# Patient Record
Sex: FEMALE | Race: WHITE | Employment: OTHER | URBAN - METROPOLITAN AREA
[De-identification: names, ages, dates, MRNs, and addresses within clinical notes are randomized per-mention and may not be internally consistent; named-entity substitution may affect disease eponyms.]

---

## 2017-04-14 ENCOUNTER — HOSPITAL ENCOUNTER (EMERGENCY)
Facility: HOSPITAL | Age: 82
Discharge: HOME | End: 2017-04-14
Attending: EMERGENCY MEDICINE
Payer: MEDICARE

## 2017-04-14 ENCOUNTER — APPOINTMENT (OUTPATIENT)
Dept: CT IMAGING | Facility: HOSPITAL | Age: 82
End: 2017-04-14
Attending: EMERGENCY MEDICINE
Payer: MEDICARE

## 2017-04-14 VITALS
SYSTOLIC BLOOD PRESSURE: 106 MMHG | HEART RATE: 61 BPM | BODY MASS INDEX: 29.83 KG/M2 | RESPIRATION RATE: 16 BRPM | WEIGHT: 158 LBS | DIASTOLIC BLOOD PRESSURE: 89 MMHG | OXYGEN SATURATION: 98 % | HEIGHT: 61 IN

## 2017-04-14 DIAGNOSIS — S00.83XA FACIAL CONTUSION, INITIAL ENCOUNTER: ICD-10-CM

## 2017-04-14 DIAGNOSIS — S06.0X0A CONCUSSION, WITHOUT LOC, INITIAL ENCOUNTER: Primary | ICD-10-CM
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
## 2017-04-14 PROCEDURE — 99284 EMERGENCY DEPT VISIT MOD MDM: CPT

## 2017-04-14 PROCEDURE — 96372 THER/PROPH/DIAG INJ SC/IM: CPT

## 2017-04-14 PROCEDURE — 70450 CT HEAD/BRAIN W/O DYE: CPT

## 2017-04-14 RX ORDER — MORPHINE SULFATE 4 MG/ML
4 INJECTION, SOLUTION INTRAMUSCULAR; INTRAVENOUS ONCE
Status: COMPLETED | OUTPATIENT
Start: 2017-04-14 | End: 2017-04-14

## 2017-04-14 NOTE — ED NOTES
All discharge instructions including  follow up reviewed with patient. Verbalized understanding. Patient wheelchaired out of ED in no apparent distress.

## 2017-04-15 NOTE — ED PROVIDER NOTES
Patient Seen in: [facility] [place] AND [facility] Emergency Department    History   Patient presents with:  Head Neck Injury (neurologic, musculoskeletal)      HPI    The patient presents after tripping over her door threshold at her house today roughly 1 hour prior to abdominal pain. Genitourinary: Negative for dysuria and hematuria. Musculoskeletal: Negative for back pain and neck pain. Skin: Negative for rash and wound. Neurological: Positive for headaches. Negative for syncope.        Constitutional and vital contusion/hematoma with contiguous left frontal scalp hematoma.          MDM     Pulse Ox: 98%, Room air, Normal     Monitor Interpretation:   normal sinus rhythm    Differential Diagnosis: Fall, facial contusion, ICH    ED Course: Patient presents after me

## 2017-04-24 PROCEDURE — 85025 COMPLETE CBC W/AUTO DIFF WBC: CPT | Performed by: INTERNAL MEDICINE

## 2017-04-24 PROCEDURE — 80061 LIPID PANEL: CPT | Performed by: INTERNAL MEDICINE

## 2017-04-24 PROCEDURE — 84443 ASSAY THYROID STIM HORMONE: CPT | Performed by: INTERNAL MEDICINE

## 2017-04-24 PROCEDURE — 36415 COLL VENOUS BLD VENIPUNCTURE: CPT | Performed by: INTERNAL MEDICINE

## 2017-04-24 PROCEDURE — 83036 HEMOGLOBIN GLYCOSYLATED A1C: CPT | Performed by: INTERNAL MEDICINE

## 2017-04-24 PROCEDURE — 80053 COMPREHEN METABOLIC PANEL: CPT | Performed by: INTERNAL MEDICINE

## 2017-05-30 PROBLEM — R11.0 NAUSEA: Status: ACTIVE | Noted: 2017-05-30

## 2017-05-30 PROBLEM — R07.9 CHEST PAIN: Status: ACTIVE | Noted: 2017-05-30

## 2017-06-13 ENCOUNTER — HOSPITAL ENCOUNTER (OUTPATIENT)
Facility: HOSPITAL | Age: 82
Setting detail: HOSPITAL OUTPATIENT SURGERY
Discharge: HOME | End: 2017-06-13
Attending: INTERNAL MEDICINE | Admitting: INTERNAL MEDICINE
Payer: MEDICARE

## 2017-06-13 ENCOUNTER — SURGERY (OUTPATIENT)
Age: 82
End: 2017-06-13

## 2017-06-13 PROCEDURE — 88305 TISSUE EXAM BY PATHOLOGIST: CPT | Performed by: INTERNAL MEDICINE

## 2017-06-13 PROCEDURE — 88312 SPECIAL STAINS GROUP 1: CPT | Performed by: INTERNAL MEDICINE

## 2017-06-13 PROCEDURE — 0DB68ZX EXCISION OF STOMACH, VIA NATURAL OR ARTIFICIAL OPENING ENDOSCOPIC, DIAGNOSTIC: ICD-10-PCS | Performed by: INTERNAL MEDICINE

## 2017-06-13 PROCEDURE — 36415 COLL VENOUS BLD VENIPUNCTURE: CPT | Performed by: INTERNAL MEDICINE

## 2017-06-13 RX ORDER — MIDAZOLAM HYDROCHLORIDE 1 MG/ML
1 INJECTION INTRAMUSCULAR; INTRAVENOUS EVERY 5 MIN PRN
Status: DISCONTINUED | OUTPATIENT
Start: 2017-06-13 | End: 2017-06-13

## 2017-06-13 RX ORDER — COVID-19 ANTIGEN TEST
220 KIT MISCELLANEOUS
COMMUNITY
End: 2017-06-14

## 2017-06-13 RX ORDER — SODIUM CHLORIDE 0.9 % (FLUSH) 0.9 %
10 SYRINGE (ML) INJECTION AS NEEDED
Status: DISCONTINUED | OUTPATIENT
Start: 2017-06-13 | End: 2017-06-13

## 2017-06-13 RX ORDER — MIDAZOLAM HYDROCHLORIDE 1 MG/ML
INJECTION INTRAMUSCULAR; INTRAVENOUS
Status: DISCONTINUED | OUTPATIENT
Start: 2017-06-13 | End: 2017-06-13

## 2017-06-13 RX ORDER — SODIUM CHLORIDE, SODIUM LACTATE, POTASSIUM CHLORIDE, CALCIUM CHLORIDE 600; 310; 30; 20 MG/100ML; MG/100ML; MG/100ML; MG/100ML
INJECTION, SOLUTION INTRAVENOUS CONTINUOUS
Status: DISCONTINUED | OUTPATIENT
Start: 2017-06-13 | End: 2017-06-13

## 2017-06-13 NOTE — H&P
[address] [place] [address] Patient Status:  [facility]    1935 MRN [id number]   Location [facility] ENDOSCOPY LAB SUITES Attending Angelita Skinner MD   Hosp Day # 0 PCP Artemio Ellis Take 1 tablet (25 mcg total) by mouth before breakfast.   atorvastatin 40 MG Oral Tab Take 1 tablet (40 mg total) by mouth nightly.  at bedtime   HYDROcodone-acetaminophen 5-325 MG Oral Tab TAKE 1 TABLET BY MOUTH TWO TIMES A DAY AS NEEDED   AMLODIPINE BESYL

## 2017-06-15 VITALS
HEART RATE: 51 BPM | SYSTOLIC BLOOD PRESSURE: 94 MMHG | HEIGHT: 61 IN | DIASTOLIC BLOOD PRESSURE: 40 MMHG | BODY MASS INDEX: 30.58 KG/M2 | RESPIRATION RATE: 20 BRPM | WEIGHT: 162 LBS | OXYGEN SATURATION: 98 %

## 2017-07-20 PROBLEM — K25.9 GASTRIC ULCER WITHOUT HEMORRHAGE OR PERFORATION, UNSPECIFIED CHRONICITY: Status: ACTIVE | Noted: 2017-07-20

## 2017-09-14 ENCOUNTER — HOSPITAL (OUTPATIENT)
Dept: OTHER | Age: 82
End: 2017-09-14
Attending: INTERNAL MEDICINE

## 2017-10-18 PROBLEM — N18.30 CKD (CHRONIC KIDNEY DISEASE) STAGE 3, GFR 30-59 ML/MIN (HCC): Status: ACTIVE | Noted: 2017-10-18

## 2017-11-07 PROCEDURE — 86803 HEPATITIS C AB TEST: CPT | Performed by: INTERNAL MEDICINE

## 2018-05-05 NOTE — PROGRESS NOTES
Quick Note:    Left voice mail message to contact office at earliest convenience to discuss test result/MD recommendations. Provided office number [phone], option 2 and ask for triage RN.  Unable to reach at home and cell #.    ______
Quick Note:    Spoke with pt regarding test results and recommendations as noted per Dr. Monica Castellanos. Pt verbalized understanding. OV 7/24/17 @ 1:40 pm [facility] office location.     ______
Pt stubbed Rit 4th toe

## 2018-12-12 PROBLEM — F32.A DEPRESSION, UNSPECIFIED DEPRESSION TYPE: Status: ACTIVE | Noted: 2018-12-12

## 2018-12-12 PROCEDURE — 83013 H PYLORI (C-13) BREATH: CPT | Performed by: INTERNAL MEDICINE

## 2019-04-11 ENCOUNTER — HOSPITAL (OUTPATIENT)
Dept: OTHER | Age: 84
End: 2019-04-11
Attending: INTERNAL MEDICINE

## 2019-05-14 PROBLEM — E03.9 HYPOTHYROIDISM, UNSPECIFIED TYPE: Status: ACTIVE | Noted: 2019-05-14

## 2022-03-24 PROBLEM — I48.91 ATRIAL FIBRILLATION, UNSPECIFIED TYPE (HCC): Status: ACTIVE | Noted: 2022-03-24

## 2022-05-11 ENCOUNTER — HOSPITAL ENCOUNTER (OUTPATIENT)
Dept: MAMMOGRAPHY | Age: 87
End: 2022-05-11
Attending: INTERNAL MEDICINE

## 2022-05-19 ENCOUNTER — HOSPITAL ENCOUNTER (OUTPATIENT)
Dept: ULTRASOUND IMAGING | Age: 87
Discharge: HOME | End: 2022-05-19
Attending: INTERNAL MEDICINE

## 2022-05-19 ENCOUNTER — HOSPITAL ENCOUNTER (OUTPATIENT)
Dept: MAMMOGRAPHY | Age: 87
Discharge: HOME | End: 2022-05-19
Attending: INTERNAL MEDICINE

## 2022-05-19 DIAGNOSIS — N64.4 BREAST PAIN, LEFT: ICD-10-CM

## 2022-05-19 DIAGNOSIS — Z98.82 H/O BILATERAL BREAST IMPLANTS: ICD-10-CM

## 2022-05-19 DIAGNOSIS — R92.8 ABNORMAL MAMMOGRAM: ICD-10-CM

## 2022-05-19 PROCEDURE — G0279 TOMOSYNTHESIS, MAMMO: HCPCS

## 2022-05-19 PROCEDURE — 76642 ULTRASOUND BREAST LIMITED: CPT

## 2022-08-18 ENCOUNTER — HOSPITAL ENCOUNTER (OUTPATIENT)
Dept: MAMMOGRAPHY | Age: 87
Discharge: HOME | End: 2022-08-18
Attending: INTERNAL MEDICINE

## 2022-08-18 DIAGNOSIS — R92.8 ABNORMAL MAMMOGRAM: ICD-10-CM

## 2022-08-18 PROCEDURE — G0279 TOMOSYNTHESIS, MAMMO: HCPCS

## 2022-10-26 ENCOUNTER — HOSPITAL ENCOUNTER (EMERGENCY)
Facility: HOSPITAL | Age: 87
Discharge: HOME | End: 2022-10-26
Attending: EMERGENCY MEDICINE
Payer: MEDICARE

## 2022-10-26 VITALS
HEIGHT: 61 IN | TEMPERATURE: 98 F | DIASTOLIC BLOOD PRESSURE: 88 MMHG | SYSTOLIC BLOOD PRESSURE: 138 MMHG | HEART RATE: 98 BPM | RESPIRATION RATE: 18 BRPM | WEIGHT: 140 LBS | OXYGEN SATURATION: 98 % | BODY MASS INDEX: 26.43 KG/M2

## 2022-10-26 DIAGNOSIS — M25.561 ARTHRALGIA OF RIGHT LOWER LEG: Primary | ICD-10-CM

## 2022-10-26 PROCEDURE — 99283 EMERGENCY DEPT VISIT LOW MDM: CPT

## 2022-10-26 RX ORDER — HYDROCODONE BITARTRATE AND ACETAMINOPHEN 5; 325 MG/1; MG/1
1 TABLET ORAL EVERY 8 HOURS PRN
Qty: 15 TABLET | Refills: 0 | Status: SHIPPED | OUTPATIENT
Start: 2022-10-26

## 2022-10-26 NOTE — ED INITIAL ASSESSMENT (HPI)
Pt came in via EMS w/ c/o right leg pain that per pt radiates down leg. Per pt states has had pain in both knees but now having pain in right leg. Pt denies all other complaints at the moment, pt denies any trauma as well.

## 2022-10-26 NOTE — ED QUICK NOTES
Pt discharged in stable condition. Discharge instructions, follow up care and rx reviewed with pt and pt's family bedside. Pt and pt's family denies any questions and/or concerns.

## 2024-08-19 ENCOUNTER — OFFICE VISIT (OUTPATIENT)
Dept: INTERNAL MEDICINE CLINIC | Facility: CLINIC | Age: 89
End: 2024-08-19

## 2024-08-19 VITALS
DIASTOLIC BLOOD PRESSURE: 82 MMHG | TEMPERATURE: 98 F | SYSTOLIC BLOOD PRESSURE: 123 MMHG | HEIGHT: 60 IN | OXYGEN SATURATION: 96 % | WEIGHT: 123 LBS | BODY MASS INDEX: 24.15 KG/M2 | HEART RATE: 81 BPM

## 2024-08-19 DIAGNOSIS — I48.91 ATRIAL FIBRILLATION, UNSPECIFIED TYPE (HCC): Primary | ICD-10-CM

## 2024-08-19 DIAGNOSIS — E53.8 B12 DEFICIENCY: ICD-10-CM

## 2024-08-19 DIAGNOSIS — I48.91 ATRIAL FIBRILLATION WITH NORMAL VENTRICULAR RATE (HCC): ICD-10-CM

## 2024-08-19 DIAGNOSIS — Z00.00 ANNUAL PHYSICAL EXAM: ICD-10-CM

## 2024-08-19 DIAGNOSIS — E55.9 VITAMIN D DEFICIENCY: ICD-10-CM

## 2024-08-19 LAB
ALBUMIN SERPL-MCNC: 4 G/DL (ref 3.2–4.8)
ALBUMIN/GLOB SERPL: 1.4 {RATIO} (ref 1–2)
ALP LIVER SERPL-CCNC: 59 U/L
ALT SERPL-CCNC: <7 U/L
ANION GAP SERPL CALC-SCNC: 10 MMOL/L (ref 0–18)
AST SERPL-CCNC: 25 U/L (ref ?–34)
BASOPHILS # BLD AUTO: 0.03 X10(3) UL (ref 0–0.2)
BASOPHILS NFR BLD AUTO: 0.4 %
BILIRUB SERPL-MCNC: 0.6 MG/DL (ref 0.2–1.1)
BUN BLD-MCNC: 20 MG/DL (ref 9–23)
BUN/CREAT SERPL: 13.2 (ref 10–20)
CALCIUM BLD-MCNC: 10.6 MG/DL (ref 8.7–10.4)
CHLORIDE SERPL-SCNC: 106 MMOL/L (ref 98–112)
CHOLEST SERPL-MCNC: 303 MG/DL (ref ?–200)
CO2 SERPL-SCNC: 29 MMOL/L (ref 21–32)
CREAT BLD-MCNC: 1.51 MG/DL
DEPRECATED RDW RBC AUTO: 48.9 FL (ref 35.1–46.3)
EGFRCR SERPLBLD CKD-EPI 2021: 33 ML/MIN/1.73M2 (ref 60–?)
EOSINOPHIL # BLD AUTO: 0.12 X10(3) UL (ref 0–0.7)
EOSINOPHIL NFR BLD AUTO: 1.8 %
ERYTHROCYTE [DISTWIDTH] IN BLOOD BY AUTOMATED COUNT: 15 % (ref 11–15)
EST. AVERAGE GLUCOSE BLD GHB EST-MCNC: 97 MG/DL (ref 68–126)
FASTING PATIENT LIPID ANSWER: YES
FASTING STATUS PATIENT QL REPORTED: YES
GLOBULIN PLAS-MCNC: 2.9 G/DL (ref 2–3.5)
GLUCOSE BLD-MCNC: 103 MG/DL (ref 70–99)
HBA1C MFR BLD: 5 % (ref ?–5.7)
HCT VFR BLD AUTO: 43.2 %
HDLC SERPL-MCNC: 49 MG/DL (ref 40–59)
HGB BLD-MCNC: 14 G/DL
IMM GRANULOCYTES # BLD AUTO: 0.01 X10(3) UL (ref 0–1)
IMM GRANULOCYTES NFR BLD: 0.1 %
LDLC SERPL CALC-MCNC: 219 MG/DL (ref ?–100)
LYMPHOCYTES # BLD AUTO: 1.97 X10(3) UL (ref 1–4)
LYMPHOCYTES NFR BLD AUTO: 28.9 %
MCH RBC QN AUTO: 28.9 PG (ref 26–34)
MCHC RBC AUTO-ENTMCNC: 32.4 G/DL (ref 31–37)
MCV RBC AUTO: 89.1 FL
MONOCYTES # BLD AUTO: 0.59 X10(3) UL (ref 0.1–1)
MONOCYTES NFR BLD AUTO: 8.7 %
NEUTROPHILS # BLD AUTO: 4.09 X10 (3) UL (ref 1.5–7.7)
NEUTROPHILS # BLD AUTO: 4.09 X10(3) UL (ref 1.5–7.7)
NEUTROPHILS NFR BLD AUTO: 60.1 %
NONHDLC SERPL-MCNC: 254 MG/DL (ref ?–130)
OSMOLALITY SERPL CALC.SUM OF ELEC: 303 MOSM/KG (ref 275–295)
PLATELET # BLD AUTO: 273 10(3)UL (ref 150–450)
POTASSIUM SERPL-SCNC: 3.1 MMOL/L (ref 3.5–5.1)
PROT SERPL-MCNC: 6.9 G/DL (ref 5.7–8.2)
RBC # BLD AUTO: 4.85 X10(6)UL
SODIUM SERPL-SCNC: 145 MMOL/L (ref 136–145)
TRIGL SERPL-MCNC: 181 MG/DL (ref 30–149)
TSI SER-ACNC: 3.79 MIU/ML (ref 0.55–4.78)
VIT B12 SERPL-MCNC: 1294 PG/ML (ref 211–911)
VIT D+METAB SERPL-MCNC: 9.1 NG/ML (ref 30–100)
VLDLC SERPL CALC-MCNC: 41 MG/DL (ref 0–30)
WBC # BLD AUTO: 6.8 X10(3) UL (ref 4–11)

## 2024-08-19 PROCEDURE — 3079F DIAST BP 80-89 MM HG: CPT | Performed by: INTERNAL MEDICINE

## 2024-08-19 PROCEDURE — 99204 OFFICE O/P NEW MOD 45 MIN: CPT | Performed by: INTERNAL MEDICINE

## 2024-08-19 PROCEDURE — 3074F SYST BP LT 130 MM HG: CPT | Performed by: INTERNAL MEDICINE

## 2024-08-19 PROCEDURE — 36415 COLL VENOUS BLD VENIPUNCTURE: CPT | Performed by: INTERNAL MEDICINE

## 2024-08-19 PROCEDURE — 3008F BODY MASS INDEX DOCD: CPT | Performed by: INTERNAL MEDICINE

## 2024-08-19 RX ORDER — ALLOPURINOL 100 MG/1
100 TABLET ORAL DAILY
Qty: 90 TABLET | Refills: 3 | Status: SHIPPED | OUTPATIENT
Start: 2024-08-19

## 2024-08-19 RX ORDER — DONEPEZIL HYDROCHLORIDE 5 MG/1
5 TABLET, FILM COATED ORAL NIGHTLY
Qty: 90 TABLET | Refills: 0 | Status: SHIPPED | OUTPATIENT
Start: 2024-08-19

## 2024-08-19 RX ORDER — ROSUVASTATIN CALCIUM 10 MG/1
10 TABLET, COATED ORAL NIGHTLY
Qty: 90 TABLET | Refills: 1 | Status: SHIPPED | OUTPATIENT
Start: 2024-08-19

## 2024-08-19 RX ORDER — LEVOTHYROXINE SODIUM 25 UG/1
25 TABLET ORAL
Qty: 90 TABLET | Refills: 3 | Status: SHIPPED | OUTPATIENT
Start: 2024-08-19

## 2024-08-19 NOTE — PROGRESS NOTES
Subjective:     Patient ID: Karen Finney is a 89 year old female.  This is a 89-year-old female with past medical history of A-fib coronary artery disease hypertension hypothyroidism was brought in by her brother to establish care with new physician according to her she stopped taking medication she has not been seen by a doctor for more than a year.  She stopped seeing cardiology.  According to her brother she still lives alone but her house condition are not livable.  She has change in her memory.  She is forgetful.  She has no help at home.  They are looking into placement to rehab  Patient is oriented 1-2.  She denies any complaints  Memory Loss  The patient's primary symptoms include memory loss.       History/Other:   Review of Systems   Constitutional: Negative.    HENT: Negative.     Eyes: Negative.    Respiratory: Negative.     Cardiovascular: Negative.    Gastrointestinal: Negative.    Endocrine: Negative.    Genitourinary: Negative.    Musculoskeletal: Negative.    Skin: Negative.    Hematological: Negative.    Psychiatric/Behavioral:  Positive for memory loss.      Current Outpatient Medications   Medication Sig Dispense Refill    HYDROcodone-acetaminophen 5-325 MG Oral Tab Take 1 tablet by mouth every 8 (eight) hours as needed. 15 tablet 0    apixaban 5 MG Oral Tab Take 1 tablet (5 mg total) by mouth 2 (two) times daily.      levothyroxine 25 MCG Oral Tab Take 1 tablet (25 mcg total) by mouth every morning before breakfast. 90 tablet 3    allopurinol 100 MG Oral Tab Take 1 tablet (100 mg total) by mouth daily. 90 tablet 3    losartan 100 MG Oral Tab Take 0.5 tablets (50 mg total) by mouth daily. 90 tablet 1    ROSUVASTATIN 10 MG Oral Tab TAKE ONE TABLET BY MOUTH ONE TIME NIGHTLY 90 tablet 1    HYDROcodone-acetaminophen 5-325 MG Oral Tab TAKE 1/2 TO 1 TABLET BY MOUTH DAILY AS NEEDED FOR PAIN 30 tablet 0    Levobunolol HCl 0.5 % Ophthalmic Solution       DHA-EPA-Flaxseed Oil-Vitamin E (THERA TEARS  NUTRITION OR) Place into the left eye.      Coenzyme Q10 (CO Q-10) 200 MG Oral Cap Take by mouth.      Turmeric (QC TUMERIC COMPLEX OR) Take 400 mg by mouth.      Omega-3 Fatty Acids (OMEGA-3 2100 OR) Take by mouth.      Ginger, Zingiber officinalis, (GINGER OR) Take 450 mg by mouth daily.      Miconazole Nitrate 2 % External Powder Apply topically as needed for Itching.      aspirin 81 MG Oral Tab Take 1 tablet (81 mg total) by mouth daily. 30 tablet 11    nitroGLYCERIN 0.4 MG Sublingual SL Tab Place 1 tablet (0.4 mg total) under the tongue every 5 (five) minutes as needed for Chest pain. 20 tablet 1    latanoprost (XALATAN) 0.005 % Ophthalmic Solution One droplet in each eye QHS.        Allergies:  Allergies   Allergen Reactions    Streptomycin OTHER (SEE COMMENTS)     numbness    Amoxicillin     Bee Venom     Penicillins     Vioxx [Rofecoxib]        Past Medical History:    ASHD (arteriosclerotic heart disease)    Concussion    from fall    Essential hypertension    Gastric ulcer without hemorrhage or perforation, unspecified chronicity    Glaucoma    Gout    HTN (hypertension)    Hyperlipidemia    OA (osteoarthritis)    Osteopenia    last dexa 2015 Frax score 14% and 3.5%  Frax 2017 155% and 4.7%      Past Surgical History:   Procedure Laterality Date    Breast biopsy      Cataract extraction Bilateral     Hysterectomy      Knee arthroscopy Right     Other surgical history Left 2013    L femur fracture with redo    Pt w/ coronary artery stent  2012    bare metal      Family History   Problem Relation Age of Onset    Heart Disorder Father         MI    Heart Disorder Brother         ASHD      Social History:   Social History     Socioeconomic History    Marital status: Single   Tobacco Use    Smoking status: Never    Smokeless tobacco: Never   Vaping Use    Vaping status: Never Used   Substance and Sexual Activity    Alcohol use: Not Currently     Alcohol/week: 2.0 standard drinks of alcohol     Types: 2 Glasses  of wine per week    Drug use: No        Objective:   Physical Exam  Vitals and nursing note reviewed.   Constitutional:       Appearance: Normal appearance.   HENT:      Head: Normocephalic and atraumatic.   Cardiovascular:      Rate and Rhythm: Normal rate and regular rhythm.      Pulses: Normal pulses.      Heart sounds: Normal heart sounds.   Pulmonary:      Effort: Pulmonary effort is normal.      Breath sounds: Normal breath sounds.   Abdominal:      Palpations: Abdomen is soft.   Musculoskeletal:      Cervical back: Normal range of motion and neck supple.   Skin:     General: Skin is warm.   Neurological:      Mental Status: She is alert. Mental status is at baseline.         Assessment & Plan:   No diagnosis found.  Dementia -will start her on Aricept, supportive care, I did discuss with her brother that she needs to be placed in long-term care facility    Paroxysmal A-fib will resume her medication, reduced dose of Eliquis because of her kidney function, discussed risk of bleeding with her brother    Hypertension monitor blood pressure since refill on medication    Hypercholesterolemia resume her medication    Discussed with her brother he is looking for long-term care facility  No orders of the defined types were placed in this encounter.      Meds This Visit:  Requested Prescriptions      No prescriptions requested or ordered in this encounter       Imaging & Referrals:  None

## 2024-08-20 ENCOUNTER — TELEPHONE (OUTPATIENT)
Dept: INTERNAL MEDICINE CLINIC | Facility: CLINIC | Age: 89
End: 2024-08-20

## 2024-08-20 NOTE — TELEPHONE ENCOUNTER
Stephanie, pharmacy [facility] called, verified patient's Name and . Needs verification on prescription potassium chloride received today. She wants to make sure medication was prescribed for 2 days only. Reviewed Dr. Zonia Serrato's instruction below. Verbalized understanding and had no further questions at this time.      Zonia Serrato MD  2024 12:11 PM CDT Back to Top      Her hemoglobin is okay, her vitamin D is low I will send medication to her pharmacy, cholesterol is very high as well as LDL cholesterol is very high she needs to start taking her medication, watch diet avoid fried foods red meat more fruits and vegetables, her potassium is low I will send potassium supplements for 2 day and we need to recheck her potassium in 2 to 3 days, her calcium is high I will have to check PTH level, her hemoglobin A1c is okay her thyroid function is ok

## 2024-08-29 ENCOUNTER — HOSPITAL ENCOUNTER (INPATIENT)
Facility: HOSPITAL | Age: 89
LOS: 4 days | Discharge: NURSING, REHAB OR LONG-TERM CARE | End: 2024-09-02
Attending: EMERGENCY MEDICINE | Admitting: HOSPITALIST
Payer: MEDICARE

## 2024-08-29 ENCOUNTER — APPOINTMENT (OUTPATIENT)
Dept: CT IMAGING | Facility: HOSPITAL | Age: 89
End: 2024-08-29
Attending: EMERGENCY MEDICINE
Payer: MEDICARE

## 2024-08-29 ENCOUNTER — APPOINTMENT (OUTPATIENT)
Dept: GENERAL RADIOLOGY | Facility: HOSPITAL | Age: 89
End: 2024-08-29
Attending: STUDENT IN AN ORGANIZED HEALTH CARE EDUCATION/TRAINING PROGRAM
Payer: MEDICARE

## 2024-08-29 ENCOUNTER — APPOINTMENT (OUTPATIENT)
Dept: GENERAL RADIOLOGY | Facility: HOSPITAL | Age: 89
End: 2024-08-29
Attending: EMERGENCY MEDICINE
Payer: MEDICARE
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
## 2024-08-29 DIAGNOSIS — E87.6 HYPOKALEMIA: ICD-10-CM

## 2024-08-29 DIAGNOSIS — R53.1 WEAKNESS GENERALIZED: ICD-10-CM

## 2024-08-29 PROBLEM — G93.40 ACUTE ENCEPHALOPATHY: Status: ACTIVE | Noted: 2024-08-29

## 2024-08-29 LAB
ALBUMIN SERPL-MCNC: 3.9 G/DL (ref 3.2–4.8)
ALBUMIN/GLOB SERPL: 1.3 {RATIO} (ref 1–2)
ALP LIVER SERPL-CCNC: 67 U/L
ALT SERPL-CCNC: 11 U/L
ANION GAP SERPL CALC-SCNC: 12 MMOL/L (ref 0–18)
AST SERPL-CCNC: 42 U/L (ref ?–34)
BASOPHILS # BLD AUTO: 0.04 X10(3) UL (ref 0–0.2)
BASOPHILS NFR BLD AUTO: 0.6 %
BILIRUB SERPL-MCNC: 1.8 MG/DL (ref 0.2–1.1)
BILIRUB UR QL: NEGATIVE
BUN BLD-MCNC: 17 MG/DL (ref 9–23)
BUN/CREAT SERPL: 13.4 (ref 10–20)
CALCIUM BLD-MCNC: 10.4 MG/DL (ref 8.7–10.4)
CHLORIDE SERPL-SCNC: 105 MMOL/L (ref 98–112)
CK SERPL-CCNC: 259 U/L
CLARITY UR: CLEAR
CO2 SERPL-SCNC: 25 MMOL/L (ref 21–32)
CREAT BLD-MCNC: 1.27 MG/DL
DEPRECATED RDW RBC AUTO: 47.4 FL (ref 35.1–46.3)
EGFRCR SERPLBLD CKD-EPI 2021: 41 ML/MIN/1.73M2 (ref 60–?)
EOSINOPHIL # BLD AUTO: 0.03 X10(3) UL (ref 0–0.7)
EOSINOPHIL NFR BLD AUTO: 0.4 %
ERYTHROCYTE [DISTWIDTH] IN BLOOD BY AUTOMATED COUNT: 14.8 % (ref 11–15)
GLOBULIN PLAS-MCNC: 3 G/DL (ref 2–3.5)
GLUCOSE BLD-MCNC: 97 MG/DL (ref 70–99)
GLUCOSE BLDC GLUCOMTR-MCNC: 90 MG/DL (ref 70–99)
GLUCOSE UR-MCNC: NORMAL MG/DL
HCT VFR BLD AUTO: 44.4 %
HGB BLD-MCNC: 14.8 G/DL
IMM GRANULOCYTES # BLD AUTO: 0.01 X10(3) UL (ref 0–1)
IMM GRANULOCYTES NFR BLD: 0.1 %
KETONES UR-MCNC: 10 MG/DL
LEUKOCYTE ESTERASE UR QL STRIP.AUTO: NEGATIVE
LYMPHOCYTES # BLD AUTO: 1.08 X10(3) UL (ref 1–4)
LYMPHOCYTES NFR BLD AUTO: 15.5 %
MAGNESIUM SERPL-MCNC: 1.9 MG/DL (ref 1.6–2.6)
MCH RBC QN AUTO: 29.1 PG (ref 26–34)
MCHC RBC AUTO-ENTMCNC: 33.3 G/DL (ref 31–37)
MCV RBC AUTO: 87.2 FL
MONOCYTES # BLD AUTO: 0.48 X10(3) UL (ref 0.1–1)
MONOCYTES NFR BLD AUTO: 6.9 %
NEUTROPHILS # BLD AUTO: 5.31 X10 (3) UL (ref 1.5–7.7)
NEUTROPHILS # BLD AUTO: 5.31 X10(3) UL (ref 1.5–7.7)
NEUTROPHILS NFR BLD AUTO: 76.5 %
NITRITE UR QL STRIP.AUTO: NEGATIVE
OSMOLALITY SERPL CALC.SUM OF ELEC: 295 MOSM/KG (ref 275–295)
PH UR: 6.5 [PH] (ref 5–8)
PLATELET # BLD AUTO: 271 10(3)UL (ref 150–450)
POTASSIUM SERPL-SCNC: 3 MMOL/L (ref 3.5–5.1)
POTASSIUM SERPL-SCNC: 3.2 MMOL/L (ref 3.5–5.1)
PROT SERPL-MCNC: 6.9 G/DL (ref 5.7–8.2)
PROT UR-MCNC: 50 MG/DL
Q-T INTERVAL: 326 MS
QRS DURATION: 90 MS
QTC CALCULATION (BEZET): 433 MS
R AXIS: 49 DEGREES
RBC # BLD AUTO: 5.09 X10(6)UL
SODIUM SERPL-SCNC: 142 MMOL/L (ref 136–145)
SP GR UR STRIP: 1.01 (ref 1–1.03)
T AXIS: -38 DEGREES
TSI SER-ACNC: 2.44 MIU/ML (ref 0.55–4.78)
UROBILINOGEN UR STRIP-ACNC: NORMAL
VENTRICULAR RATE: 106 BPM
WBC # BLD AUTO: 7 X10(3) UL (ref 4–11)

## 2024-08-29 PROCEDURE — 71045 X-RAY EXAM CHEST 1 VIEW: CPT | Performed by: EMERGENCY MEDICINE

## 2024-08-29 PROCEDURE — 99223 1ST HOSP IP/OBS HIGH 75: CPT | Performed by: HOSPITALIST

## 2024-08-29 PROCEDURE — 72170 X-RAY EXAM OF PELVIS: CPT | Performed by: STUDENT IN AN ORGANIZED HEALTH CARE EDUCATION/TRAINING PROGRAM

## 2024-08-29 PROCEDURE — 70450 CT HEAD/BRAIN W/O DYE: CPT | Performed by: EMERGENCY MEDICINE

## 2024-08-29 RX ORDER — ONDANSETRON 2 MG/ML
4 INJECTION INTRAMUSCULAR; INTRAVENOUS EVERY 6 HOURS PRN
Status: DISCONTINUED | OUTPATIENT
Start: 2024-08-29 | End: 2024-09-02

## 2024-08-29 RX ORDER — LOSARTAN POTASSIUM 50 MG/1
50 TABLET ORAL DAILY
Status: DISCONTINUED | OUTPATIENT
Start: 2024-08-29 | End: 2024-09-02

## 2024-08-29 RX ORDER — HYDRALAZINE HYDROCHLORIDE 20 MG/ML
10 INJECTION INTRAMUSCULAR; INTRAVENOUS
Status: DISCONTINUED | OUTPATIENT
Start: 2024-08-29 | End: 2024-09-02

## 2024-08-29 RX ORDER — METOPROLOL TARTRATE 100 MG
100 TABLET ORAL
Status: DISCONTINUED | OUTPATIENT
Start: 2024-08-29 | End: 2024-08-31

## 2024-08-29 RX ORDER — ACETAMINOPHEN 500 MG
500 TABLET ORAL EVERY 4 HOURS PRN
Status: DISCONTINUED | OUTPATIENT
Start: 2024-08-29 | End: 2024-09-02

## 2024-08-29 RX ORDER — SODIUM CHLORIDE 9 MG/ML
INJECTION, SOLUTION INTRAVENOUS CONTINUOUS
Status: DISCONTINUED | OUTPATIENT
Start: 2024-08-29 | End: 2024-09-02

## 2024-08-29 RX ORDER — LEVOTHYROXINE SODIUM 25 UG/1
25 TABLET ORAL
Status: DISCONTINUED | OUTPATIENT
Start: 2024-08-30 | End: 2024-08-29

## 2024-08-29 RX ORDER — ACETAMINOPHEN 500 MG
500 TABLET ORAL EVERY 4 HOURS PRN
Status: DISCONTINUED | OUTPATIENT
Start: 2024-08-29 | End: 2024-08-29

## 2024-08-29 RX ORDER — METOCLOPRAMIDE HYDROCHLORIDE 5 MG/ML
5 INJECTION INTRAMUSCULAR; INTRAVENOUS EVERY 8 HOURS PRN
Status: DISCONTINUED | OUTPATIENT
Start: 2024-08-29 | End: 2024-08-29

## 2024-08-29 RX ORDER — DONEPEZIL HYDROCHLORIDE 5 MG/1
5 TABLET, FILM COATED ORAL NIGHTLY
Status: DISCONTINUED | OUTPATIENT
Start: 2024-08-29 | End: 2024-08-29

## 2024-08-29 NOTE — PLAN OF CARE
Pt admitted for AMS and fall at home, hx dementia. Pt alert and oriented x1. Max assist. IVF at 100. PRN Hydralazine given. Pt family updated on plan of care. Plan for Echo and MRI. Safety precautions in place. Call light within reach.    Problem: Patient Centered Care  Goal: Patient preferences are identified and integrated in the patient's plan of care  Description: Interventions:  - What would you like us to know as we care for you?  - Provide timely, complete, and accurate information to patient/family  - Incorporate patient and family knowledge, values, beliefs, and cultural backgrounds into the planning and delivery of care  - Encourage patient/family to participate in care and decision-making at the level they choose  - Honor patient and family perspectives and choices  Outcome: Progressing     Problem: Patient/Family Goals  Goal: Patient/Family Long Term Goal  Description: Patient's Long Term Goal: discharge    Interventions:  - Monitor vital signs  - Echo  - MRI  - See additional Care Plan goals for specific interventions  Outcome: Progressing  Goal: Patient/Family Short Term Goal  Description: Patient's Short Term Goal: feel better    Interventions:   - Follow MD orders  - See additional Care Plan goals for specific interventions  Outcome: Progressing     Problem: CARDIOVASCULAR - ADULT  Goal: Maintains optimal cardiac output and hemodynamic stability  Description: INTERVENTIONS:  - Monitor vital signs, rhythm, and trends  - Monitor for bleeding, hypotension and signs of decreased cardiac output  - Evaluate effectiveness of vasoactive medications to optimize hemodynamic stability  - Monitor arterial and/or venous puncture sites for bleeding and/or hematoma  - Assess quality of pulses, skin color and temperature  - Assess for signs of decreased coronary artery perfusion - ex. Angina  - Evaluate fluid balance, assess for edema, trend weights  Outcome: Progressing  Goal: Absence of cardiac arrhythmias or at  baseline  Description: INTERVENTIONS:  - Continuous cardiac monitoring, monitor vital signs, obtain 12 lead EKG if indicated  - Evaluate effectiveness of antiarrhythmic and heart rate control medications as ordered  - Initiate emergency measures for life threatening arrhythmias  - Monitor electrolytes and administer replacement therapy as ordered  Outcome: Progressing     Problem: NEUROLOGICAL - ADULT  Goal: Achieves stable or improved neurological status  Description: INTERVENTIONS  - Assess for and report changes in neurological status  - Initiate measures to prevent increased intracranial pressure  - Maintain blood pressure and fluid volume within ordered parameters to optimize cerebral perfusion and minimize risk of hemorrhage  - Monitor temperature, glucose, and sodium. Initiate appropriate interventions as ordered  Outcome: Progressing  Goal: Achieves maximal functionality and self care  Description: INTERVENTIONS  - Monitor swallowing and airway patency with patient fatigue and changes in neurological status  - Encourage and assist patient to increase activity and self care with guidance from PT/OT  - Encourage visually impaired, hearing impaired and aphasic patients to use assistive/communication devices  Outcome: Progressing     Problem: Impaired Functional Mobility  Goal: Achieve highest/safest level of mobility/gait  Description: Interventions:  - Assess patient's functional ability and stability  - Promote increasing activity/tolerance for mobility and gait  - Educate and engage patient/family in tolerated activity level and precautions  - Recommend patient transfer to bedside chair toward strongest side  Outcome: Progressing     Problem: SAFETY ADULT - FALL  Goal: Free from fall injury  Description: INTERVENTIONS:  - Assess pt frequently for physical needs  - Identify cognitive and physical deficits and behaviors that affect risk of falls.  - Lyons fall precautions as indicated by assessment.  -  Educate pt/family on patient safety including physical limitations  - Instruct pt to call for assistance with activity based on assessment  - Modify environment to reduce risk of injury  - Provide assistive devices as appropriate  - Consider OT/PT consult to assist with strengthening/mobility  - Encourage toileting schedule  Outcome: Progressing     Problem: DISCHARGE PLANNING  Goal: Discharge to home or other facility with appropriate resources  Description: INTERVENTIONS:  - Identify barriers to discharge w/pt and caregiver  - Include patient/family/discharge partner in discharge planning  - Arrange for needed discharge resources and transportation as appropriate  - Identify discharge learning needs (meds, wound care, etc)  - Arrange for interpreters to assist at discharge as needed  - Consider post-discharge preferences of patient/family/discharge partner  - Complete POLST form as appropriate  - Assess patient's ability to be responsible for managing their own health  - Refer to Case Management Department for coordinating discharge planning if the patient needs post-hospital services based on physician/LIP order or complex needs related to functional status, cognitive ability or social support system  Outcome: Progressing

## 2024-08-29 NOTE — CONSULTS
[facility]  Cardiology Consultation    Karen Finney Patient Status:  Emergency    1935 MRN [id number]   Location [facility] EMERGENCY DEPARTMENT Attending Angel Sharma MD   Hosp Day # 0 PCP LORENZO AYALA MD     Date of Admission:  2024  Date of Consult:  2024  Reason for Consultation:   Atrial fibrillation    History of Present Illness:   Patient is an 89-year-old female with a history of dementia, CAD s/p PCI , permanent atrial fibrillation, HTN, HLD, who was brought in by family member after being found down.  History was limited due to advanced dementia no family present bedside.  Apparently patient was found altered with recent report of increased confusion.  She was last seen well several days ago.  Currently she denies any particular complaints such as chest pain, palpitations, shortness of breath, edema, or dizziness.    Cardiac history:  CAD s/p PCI   Atrial fibrillation  HTN  HLD    Past Medical History  Past Medical History:    ASHD (arteriosclerotic heart disease)    Concussion    from fall    Essential hypertension    Gastric ulcer without hemorrhage or perforation, unspecified chronicity    Glaucoma    Gout    HTN (hypertension)    Hyperlipidemia    OA (osteoarthritis)    Osteopenia    last dexa 2015 Frax score 14% and 3.5%  Frax 2017 155% and 4.7%     Past Surgical History  Past Surgical History:   Procedure Laterality Date    Breast biopsy      Cataract extraction Bilateral     Hysterectomy      Knee arthroscopy Right     Other surgical history Left 2013    L femur fracture with redo    Pt w/ coronary artery stent      bare metal     Family History  Unable to obtain due to of dementia.  Family History   Problem Relation Age of Onset    Heart Disorder Father         MI    Heart Disorder Brother         ASHD     Social History  Patient apparently lives at home alone.  Pediatric History   Patient Parents    Not on file     Other Topics  Concern    Not on file   Social History Narrative    Not on file         Current Medications:  Current Facility-Administered Medications   Medication Dose Route Frequency    potassium chloride 40 mEq in 250mL sodium chloride 0.9% IVPB premix  40 mEq Intravenous Once     (Not in a hospital admission)    Allergies  Allergies   Allergen Reactions    Streptomycin OTHER (SEE COMMENTS)     numbness    Amoxicillin     Bee Venom     Penicillins     Vioxx [Rofecoxib]        Review of Systems:     14 point review of systems completed and negative except as noted.    Physical Exam:   Patient Vitals for the past 24 hrs:   BP Temp Temp src Pulse Resp SpO2   08/29/24 1445 (!) 165/98 -- -- 96 18 100 %   08/29/24 1400 (!) 146/91 -- -- 72 16 97 %   08/29/24 1345 158/83 -- -- 68 18 98 %   08/29/24 1330 (!) 153/96 -- -- 88 13 100 %   08/29/24 1315 (!) 150/92 -- -- 88 13 99 %   08/29/24 1214 -- 98.2 °F (36.8 °C) Oral -- -- --   08/29/24 1211 (!) 168/108 -- -- 116 18 98 %     Intake/Output:   Last 3 shifts:   Intake/Output                   08/27/24 0700 - 08/28/24 0659 (Not Admitted) 08/28/24 0700 - 08/29/24 0659 (Not Admitted) 08/29/24 0700 - 08/30/24 0659       Intake    I.V.  --  --  1000    Volume (mL) (sodium chloride 0.9 % IV bolus 1,000 mL) -- -- 1000    Total Intake -- -- 1000       Output    Urine  --  --  30    Intermittent/Straight Cath (mL) -- -- 30    Total Output -- -- 30       Net I/O     -- -- 970          Scheduled Meds:    potassium chloride in sodium chloride 0.9%  40 mEq Intravenous Once     General: Alert and oriented x 1. No apparent distress.   HEENT: Normocephalic, anicteric sclera, neck supple, no thyromegaly or adenopathy.  Neck: No JVD, carotids 2+, no bruits.  Cardiac: Irregularly irregular rhythm, normal rate. S1, S2 normal. No murmur, pericardial rub, S3, or extra cardiac sounds.  Lungs: Clear without wheezes, rales, rhonchi or dullness.  Normal excursions and effort.  Abdomen: Soft, non-tender. No  organosplenomegally, mass or rebound, BS-present.  Extremities: Without clubbing or cyanosis. No edema.    Neurologic: Alert, normal affect. No focal defects  Skin: Warm and dry.     Results:   Laboratory Data:  Lab Results   Component Value Date    WBC 7.0 08/29/2024    HGB 14.8 08/29/2024    HCT 44.4 08/29/2024    .0 08/29/2024    CREATSERUM 1.27 (H) 08/29/2024    BUN 17 08/29/2024     08/29/2024    K 3.0 (L) 08/29/2024     08/29/2024    CO2 25.0 08/29/2024    GLU 97 08/29/2024    CA 10.4 08/29/2024    ALB 3.9 08/29/2024    ALKPHO 67 08/29/2024    TP 6.9 08/29/2024    AST 42 (H) 08/29/2024    ALT 11 08/29/2024    TSH 2.438 08/29/2024    GGT 44 11/07/2017    MG 1.9 08/29/2024     (H) 08/29/2024    B12 1,294 (H) 08/19/2024         Recent Labs   Lab 08/29/24  1216   GLU 97   BUN 17   CREATSERUM 1.27*   CA 10.4      K 3.0*      CO2 25.0     Recent Labs   Lab 08/29/24  1216   RBC 5.09   HGB 14.8   HCT 44.4   MCV 87.2   MCH 29.1   MCHC 33.3   RDW 14.8   NEPRELIM 5.31   WBC 7.0   .0       No results for input(s): \"BNPML\" in the last 168 hours.    Recent Labs   Lab 08/29/24  1216   *       Imaging:  CT BRAIN OR HEAD (CPT=70450)    Result Date: 8/29/2024  CONCLUSION:  1. No acute intracranial hemorrhage.  No acute intracranial CT abnormalities 2. Cerebral cortical atrophy, prominently frontal temporal regions. 3. Mild cerebellar atrophy age appropriate. 4. Asymmetric soft tissue subcutaneous swelling/calvarial hematoma right temporal parietal and left posterior parietal region has developed suggesting subacute swelling/hematoma 5. Chronic appearing left mastoid effusions.     Dictated by (CST): [place] Bradley MD on 8/29/2024 at 2:35 PM     Finalized by (CST): [place] Bradley MD on 8/29/2024 at 2:46 PM          XR CHEST AP PORTABLE  (CPT=71045)    Result Date: 8/29/2024  CONCLUSION:  1. No acute disease in the chest.    Dictated by (CST): Benito Diaz MD on  8/29/2024 at 1:22 PM     Finalized by (CST): Benito Diaz MD on 8/29/2024 at 1:23 PM           Impression:   Confusion, weakness  - Apparently living at home alone, inability to take care of herself  - Mildly elevated CK level, last seen well several days prior  - CT head with evidence of subcutaneous hematoma, no fracture  - Further workup/management per primary service    Atrial fibrillation  - Mildly elevated rates, 90s to low 100s  - Restart home metoprolol to tartrate  - Hold Eliquis with hematoma, may need to reassess risk of long-term anticoagulation given fall risk and age    CAD s/p PCI 2012  - No chest pain or shortness of breath  - Restart aspirin and statin    HTN  - Resume home Antepar tensive medications    HLD  - Restart statin    Thank you for allowing me to participate in the care of your patient.    Kevin Peña MD  [facility]  8/29/2024

## 2024-08-29 NOTE — ED QUICK NOTES
Orders for admission, patient is aware of plan and ready to go upstairs. Any questions, please call ED RN james at extension [phone].     Patient Covid vaccination status: Fully vaccinated     COVID Test Ordered in ED: None    COVID Suspicion at Admission: N/A    Running Infusions:  K Cl    Mental Status/LOC at time of transport: A&Ox1    Other pertinent information:   CIWA score: N/A   NIH score:  N/A

## 2024-08-29 NOTE — ED PROVIDER NOTES
Patient Seen in: [facility] 3w/sw      History     Chief Complaint   Patient presents with    Fall     Stated Complaint: Hypokalemia    Subjective:   HPI        Objective:   Past Medical History:    ASHD (arteriosclerotic heart disease)    Concussion    from fall    Essential hypertension    Gastric ulcer without hemorrhage or perforation, unspecified chronicity    Glaucoma    Gout    HTN (hypertension)    Hyperlipidemia    OA (osteoarthritis)    Osteopenia    last dexa 2015 Frax score 14% and 3.5%  Frax 2017 155% and 4.7%              Past Surgical History:   Procedure Laterality Date    Breast biopsy      Cataract extraction Bilateral     Hysterectomy      Knee arthroscopy Right     Other surgical history Left 2013    L femur fracture with redo    Pt w/ coronary artery stent  2012    bare metal                Social History     Socioeconomic History    Marital status: Single   Tobacco Use    Smoking status: Never    Smokeless tobacco: Never   Vaping Use    Vaping status: Never Used   Substance and Sexual Activity    Alcohol use: Not Currently     Alcohol/week: 2.0 standard drinks of alcohol     Types: 2 Glasses of wine per week    Drug use: No     Social Determinants of Health     Food Insecurity: No Food Insecurity (8/29/2024)    Food Insecurity     Food Insecurity: Never true   Transportation Needs: No Transportation Needs (8/29/2024)    Transportation Needs     Lack of Transportation: No   Housing Stability: Low Risk  (8/29/2024)    Housing Stability     Housing Instability: No              Review of Systems    Positive for stated Chief Complaint: Fall    Other systems are as noted in HPI.  Constitutional and vital signs reviewed.      All other systems reviewed and negative except as noted above.    Physical Exam     ED Triage Vitals   BP 08/29/24 1211 (!) 168/108   Pulse 08/29/24 1211 116   Resp 08/29/24 1211 18   Temp 08/29/24 1214 98.2 °F (36.8 °C)   Temp src 08/29/24 1214 Oral   SpO2 08/29/24 1211  98 %   O2 Device 08/29/24 1211 None (Room air)       Current Vitals:   Vital Signs  BP: (!) 184/119  Pulse: 77  Resp: 18  Temp: 98.2 °F (36.8 °C)  Temp src: Oral  MAP (mmHg): (!) 139    Oxygen Therapy  SpO2: 97 %  O2 Device: None (Room air)            Physical Exam          ED Course     Labs Reviewed   CBC WITH DIFFERENTIAL WITH PLATELET - Abnormal; Notable for the following components:       Result Value    RDW-SD 47.4 (*)     All other components within normal limits   COMP METABOLIC PANEL (14) - Abnormal; Notable for the following components:    Potassium 3.0 (*)     Creatinine 1.27 (*)     eGFR-Cr 41 (*)     AST 42 (*)     Bilirubin, Total 1.8 (*)     All other components within normal limits   CK CREATINE KINASE (NOT CREATININE) - Abnormal; Notable for the following components:     (*)     All other components within normal limits   URINALYSIS WITH CULTURE REFLEX - Abnormal; Notable for the following components:    Ketones Urine 10 (*)     Blood Urine Trace (*)     Protein Urine 50 (*)     Squamous Epi. Cells Few (*)     All other components within normal limits   TSH W REFLEX TO FREE T4 - Normal   MAGNESIUM - Normal   POCT GLUCOSE - Normal   RAINBOW DRAW LAVENDER   RAINBOW DRAW LIGHT GREEN   RAINBOW DRAW BLUE   RAINBOW DRAW GOLD     EKG    Rate, intervals and axes as noted on EKG Report.  Rate: 106  Rhythm: Atrial Fibrillation  Reading: Atrial fibrillation with rapid ventricular response.  No other acute signs of ischemia or other abnormal intervals.                            MDM      89-year-old female with a history of A-fib on apixaban, hypertension, osteoarthritis, hypothyroidism, and dementia presents today after being found down by family member.  Per her brother, who found her, she was last seen 2 days ago.  He called her this morning and there was no response prompting him to visit.  He found the patient on the floor unable to get up.  The patient's history is somewhat unreliable but she states  that she fell getting out of bed.  Denies head injury or any current pain.  They deny recent illness, medication changes, or other abnormal ingestions.  The brother does note that her dementia has been getting worse and have akin to placement recently.    On exam, hypertensive 160s over 100s, tachycardic in the 110s, nontoxic-appearing, no clear external evidence of injury, no clear stigmata of infection, dry mucous membranes    Differential: Fall, concern for rhabdomyolysis, concern for infection or electrolyte abnormality, deconditioning    Lab workup shows hypokalemia at 3, mild elevation in creatinine, but otherwise relatively reassuring.  Potassium repletion started.  I independently reviewed the patient's chest x-ray. No clear evidence of consolidation or pneumothorax.  I personally reviewed the x-rays and agree with the radiologist read: no acute fracture visualized.  CT head did not show acute intracranial bleed or CVA.    Given the concern for safety at home and lack of support, will admit for placement.  Admission disposition: 8/29/2024  1:44 PM                                        MDM    Disposition and Plan     Clinical Impression:  1. Fall, initial encounter    2. Weakness generalized    3. Hypokalemia         Disposition:  Admit  8/29/2024  1:44 pm    Follow-up:  No follow-up provider specified.  We recommend that you schedule follow up care with a primary care provider within the next three months to obtain basic health screening including reassessment of your blood pressure.      Medications Prescribed:  Current Discharge Medication List                            Hospital Problems       Present on Admission  Date Reviewed: 8/20/2024            ICD-10-CM Noted POA    * (Principal) Fall, initial encounter W19.XXXA 8/29/2024 Unknown    Acute encephalopathy G93.40 8/29/2024 Unknown    Fall W19.XXXA 8/29/2024 Unknown    Hypokalemia E87.6 8/29/2024 Unknown    Weakness generalized R53.1 8/29/2024 Unknown

## 2024-08-29 NOTE — ED INITIAL ASSESSMENT (HPI)
Pt to ED via EMS from home after being found by brother during wellness check. States pt was last seen about 2 days ago. Was recently dx with dementia was recommended nursing home. Pt appears not well kept and disheveled. Per EMS, medications appeared to have been in brand new boxed untouched. +eliquis

## 2024-08-29 NOTE — H&P
[facility]    PATIENT'S NAME: JONATAN PAL   ATTENDING PHYSICIAN: Angel Sharma MD   PATIENT ACCOUNT#:   [id number]    LOCATION:  [address]  MEDICAL RECORD #:   [id number]       YOB: 1935  ADMISSION DATE:       08/29/2024    HISTORY AND PHYSICAL EXAMINATION    CHIEF COMPLAINT:  Acute encephalopathy.    HISTORY OF PRESENT ILLNESS:  Patient is an 89-year-old  female with history of chronic atrial fibrillation, hypertension, noncompliant with her medications.  She was seen by her primary care physician 2 weeks ago and given a prescription of her medications.  Her brother picked them up, but he doubts that she takes them.  Last time her brother spoke to her was on Monday, 4 days ago.  Today, he called her, and she did not answer.  He went to see her, and he found her lying on the floor in her house.  Patient is alert, but she is not able to recall for how long she has been on the floor.  She thinks it has been since yesterday.  In the emergency room today, CBC and chemistry were unremarkable. GFR is 41, which is at baseline.  Urinalysis showed no evidence of urinary tract infection.  CT scan of the brain showed no acute findings.  There is soft tissue subcutaneous swelling. Calvarial hematoma, right temporoparietal and left posterior parietal regions, has developed, suggesting subacute swelling or hematoma.  Chest x-ray showed no acute disease.  X-ray of the pelvis still pending.  CK today 259.  EKG showed atrial fibrillation, rate between 90 and 110.    PAST MEDICAL HISTORY:  Essential hypertension, osteoarthritis, dementia, peptic ulcer disease, gout, hyperlipidemia, hypothyroidism, paroxysmal atrial fibrillation, glaucoma.    PAST SURGICAL HISTORY:  Cataract procedure, hysterectomy, left femur open reduction and internal fixation with hardware exchange, breast implants, bilateral knee arthroscopic procedures, and cataract procedure.    MEDICATIONS:  Currently none.   Noncompliant with medications.     ALLERGIES:  Amoxicillin, bee venom, and penicillin.    FAMILY HISTORY:  Positive for coronary artery disease.    SOCIAL HISTORY:  No tobacco, alcohol, or drug use.  Lives by herself.  At baseline, uses a cane.  Usually independent for basic activities of daily living.     REVIEW OF SYSTEMS:  Patient said she went to see something and she fell.  She is really a poor historian.  Per her brother, she does have some degree of dementia, but she seems more confused than usual.  Other 12-point review of systems is unobtainable.  Patient said that she has pain in both knees and hips, and she does not recall how she fell.  She does not think she passed out.  She cannot remember how long she has been on the floor, but she said probably since yesterday.  Otherwise unremarkable.       PHYSICAL EXAMINATION:    GENERAL:  Alert, able to answer simple questions.  Does not appear to be in distress.   VITAL SIGNS:  Temperature 98.2, pulse 96, respiratory rate 18, blood pressure 146/91, pulse ox 100% on room air.  HEENT:  Atraumatic.  Oropharynx clear.  Dry mucous membranes.  Normal hard and soft palate.  Eyes:  Anicteric sclerae.    NECK:  Supple.  No lymphadenopathy.  Trachea midline.  Full range of motion.   LUNGS:  Clear to auscultation bilaterally.  Normal respiratory effort.    HEART:  Irregularly irregular rhythm.  S1 and S2 auscultated.  No murmur.    ABDOMEN:  Soft, nondistended.  No tenderness.  Positive bowel sounds.   EXTREMITIES:  No peripheral edema, clubbing or cyanosis.  Passive flexion of bilateral hips reproduces some pain.  Patient does have chronic pain in her left hip area at the site of her left femur open reduction and internal fixation.    ASSESSMENT:    1.   Acute encephalopathy.  2.   Underlying atrial fibrillation.   3.   Essential hypertension.  4.   Chronic kidney disease stage 3.    PLAN:  Patient will be admitted to telemetry floor.  Obtain MRI scan of the brain, 2D  echocardiogram with Doppler.  Resume her blood pressure medications and anticoagulation.  Fall precautions.  Obtain neurology and cardiology consults.  Further recommendations to follow.     Dictated By Cheo Braga MD  d: 08/29/2024 15:27:28  t: 08/29/2024 15:34:00  Job [id number]  FB/    cc: Angel Sharma MD

## 2024-08-30 ENCOUNTER — APPOINTMENT (OUTPATIENT)
Dept: CV DIAGNOSTICS | Facility: HOSPITAL | Age: 89
End: 2024-08-30
Attending: HOSPITALIST
Payer: MEDICARE

## 2024-08-30 LAB
ANION GAP SERPL CALC-SCNC: 7 MMOL/L (ref 0–18)
BASOPHILS # BLD AUTO: 0.04 X10(3) UL (ref 0–0.2)
BASOPHILS NFR BLD AUTO: 0.7 %
BUN BLD-MCNC: 17 MG/DL (ref 9–23)
BUN/CREAT SERPL: 15.7 (ref 10–20)
CALCIUM BLD-MCNC: 9.2 MG/DL (ref 8.7–10.4)
CHLORIDE SERPL-SCNC: 115 MMOL/L (ref 98–112)
CO2 SERPL-SCNC: 22 MMOL/L (ref 21–32)
CREAT BLD-MCNC: 1.08 MG/DL
DEPRECATED RDW RBC AUTO: 48.9 FL (ref 35.1–46.3)
EGFRCR SERPLBLD CKD-EPI 2021: 49 ML/MIN/1.73M2 (ref 60–?)
EOSINOPHIL # BLD AUTO: 0.14 X10(3) UL (ref 0–0.7)
EOSINOPHIL NFR BLD AUTO: 2.3 %
ERYTHROCYTE [DISTWIDTH] IN BLOOD BY AUTOMATED COUNT: 15.2 % (ref 11–15)
GLUCOSE BLD-MCNC: 92 MG/DL (ref 70–99)
HCT VFR BLD AUTO: 34.5 %
HGB BLD-MCNC: 11.4 G/DL
IMM GRANULOCYTES # BLD AUTO: 0.02 X10(3) UL (ref 0–1)
IMM GRANULOCYTES NFR BLD: 0.3 %
LYMPHOCYTES # BLD AUTO: 1.31 X10(3) UL (ref 1–4)
LYMPHOCYTES NFR BLD AUTO: 22 %
MCH RBC QN AUTO: 29.2 PG (ref 26–34)
MCHC RBC AUTO-ENTMCNC: 33 G/DL (ref 31–37)
MCV RBC AUTO: 88.5 FL
MONOCYTES # BLD AUTO: 0.49 X10(3) UL (ref 0.1–1)
MONOCYTES NFR BLD AUTO: 8.2 %
NEUTROPHILS # BLD AUTO: 3.96 X10 (3) UL (ref 1.5–7.7)
NEUTROPHILS # BLD AUTO: 3.96 X10(3) UL (ref 1.5–7.7)
NEUTROPHILS NFR BLD AUTO: 66.5 %
OSMOLALITY SERPL CALC.SUM OF ELEC: 299 MOSM/KG (ref 275–295)
PLATELET # BLD AUTO: 232 10(3)UL (ref 150–450)
POTASSIUM SERPL-SCNC: 3.2 MMOL/L (ref 3.5–5.1)
POTASSIUM SERPL-SCNC: 3.2 MMOL/L (ref 3.5–5.1)
RBC # BLD AUTO: 3.9 X10(6)UL
SODIUM SERPL-SCNC: 144 MMOL/L (ref 136–145)
WBC # BLD AUTO: 6 X10(3) UL (ref 4–11)

## 2024-08-30 PROCEDURE — 99222 1ST HOSP IP/OBS MODERATE 55: CPT | Performed by: INTERNAL MEDICINE

## 2024-08-30 PROCEDURE — 99232 SBSQ HOSP IP/OBS MODERATE 35: CPT | Performed by: INTERNAL MEDICINE

## 2024-08-30 PROCEDURE — 93306 TTE W/DOPPLER COMPLETE: CPT | Performed by: HOSPITALIST

## 2024-08-30 RX ORDER — POTASSIUM CHLORIDE 1500 MG/1
40 TABLET, EXTENDED RELEASE ORAL ONCE
Status: COMPLETED | OUTPATIENT
Start: 2024-08-30 | End: 2024-08-30

## 2024-08-30 NOTE — DIETARY NOTE
BRIEF DIETITIAN NOTE     Pt screened for MST of 2 for wt loss and declined PO intake. Found to be at no nutrition risk at this time. Only 1 meal recorded so far at 30%, but RN reports pt does eat pretty well once her meal is set up in front of her. Weight relatively stable since 2022, no recent significant weight changes noted per wt hx review. Will add ONS for now to encourage intakes. RD also liberalized diet to remove cardiac restriction to hopefully encourage improved intakes as well. Will monitor and f/u after weekend to check on PO. Please consult RD if further nutrition intervention is needed.     Percent Meals Eaten (last 6 days)       Date/Time Percent Meals Eaten (%)    08/30/24 1300 30 %             Patient Weight(s) for the past 336 hrs:   Weight   08/30/24 0541 56.6 kg (124 lb 12.8 oz)   08/29/24 1752 55.7 kg (122 lb 11.2 oz)        Wt Readings from Last 6 Encounters:   08/30/24 56.6 kg (124 lb 12.8 oz)   08/19/24 55.8 kg (123 lb)   10/26/22 63.5 kg (140 lb)   03/31/22 61.2 kg (135 lb)   03/24/22 63.5 kg (140 lb)   03/21/22 63 kg (139 lb)        F/u per protocol or as appropriate.       Eden Vila RD, LDN  Clinical Dietitian  P: [phone]     breastfeeding exclusively

## 2024-08-30 NOTE — PLAN OF CARE
Problem: Patient Centered Care  Goal: Patient preferences are identified and integrated in the patient's plan of care  Description: Interventions:  - What would you like us to know as we care for you? From home alone  - Provide timely, complete, and accurate information to patient/family  - Incorporate patient and family knowledge, values, beliefs, and cultural backgrounds into the planning and delivery of care  - Encourage patient/family to participate in care and decision-making at the level they choose  - Honor patient and family perspectives and choices  Outcome: Progressing     Problem: Patient/Family Goals  Goal: Patient/Family Long Term Goal  Description: Patient's Long Term Goal: discharge    Interventions:  - Monitor vital signs  - Echo  - MRI  - See additional Care Plan goals for specific interventions  Outcome: Progressing  Goal: Patient/Family Short Term Goal  Description: Patient's Short Term Goal: feel better    Interventions:   - Follow MD orders  - See additional Care Plan goals for specific interventions  Outcome: Progressing     Problem: CARDIOVASCULAR - ADULT  Goal: Maintains optimal cardiac output and hemodynamic stability  Description: INTERVENTIONS:  - Monitor vital signs, rhythm, and trends  - Monitor for bleeding, hypotension and signs of decreased cardiac output  - Evaluate effectiveness of vasoactive medications to optimize hemodynamic stability  - Monitor arterial and/or venous puncture sites for bleeding and/or hematoma  - Assess quality of pulses, skin color and temperature  - Assess for signs of decreased coronary artery perfusion - ex. Angina  - Evaluate fluid balance, assess for edema, trend weights  Outcome: Progressing  Goal: Absence of cardiac arrhythmias or at baseline  Description: INTERVENTIONS:  - Continuous cardiac monitoring, monitor vital signs, obtain 12 lead EKG if indicated  - Evaluate effectiveness of antiarrhythmic and heart rate control medications as ordered  -  Initiate emergency measures for life threatening arrhythmias  - Monitor electrolytes and administer replacement therapy as ordered  Outcome: Progressing     Problem: NEUROLOGICAL - ADULT  Goal: Achieves stable or improved neurological status  Description: INTERVENTIONS  - Assess for and report changes in neurological status  - Initiate measures to prevent increased intracranial pressure  - Maintain blood pressure and fluid volume within ordered parameters to optimize cerebral perfusion and minimize risk of hemorrhage  - Monitor temperature, glucose, and sodium. Initiate appropriate interventions as ordered  Outcome: Progressing  Goal: Achieves maximal functionality and self care  Description: INTERVENTIONS  - Monitor swallowing and airway patency with patient fatigue and changes in neurological status  - Encourage and assist patient to increase activity and self care with guidance from PT/OT  - Encourage visually impaired, hearing impaired and aphasic patients to use assistive/communication devices  Outcome: Progressing     Problem: Impaired Functional Mobility  Goal: Achieve highest/safest level of mobility/gait  Description: Interventions:  - Assess patient's functional ability and stability  - Promote increasing activity/tolerance for mobility and gait  - Educate and engage patient/family in tolerated activity level and precautions  Outcome: Progressing     Problem: SAFETY ADULT - FALL  Goal: Free from fall injury  Description: INTERVENTIONS:  - Assess pt frequently for physical needs  - Identify cognitive and physical deficits and behaviors that affect risk of falls.  - [place] fall precautions as indicated by assessment.  - Educate pt/family on patient safety including physical limitations  - Instruct pt to call for assistance with activity based on assessment  - Modify environment to reduce risk of injury  - Provide assistive devices as appropriate  - Consider OT/PT consult to assist with  strengthening/mobility  - Encourage toileting schedule  Outcome: Progressing     Problem: DISCHARGE PLANNING  Goal: Discharge to home or other facility with appropriate resources  Description: INTERVENTIONS:  - Identify barriers to discharge w/pt and caregiver  - Include patient/family/discharge partner in discharge planning  - Arrange for needed discharge resources and transportation as appropriate  - Identify discharge learning needs (meds, wound care, etc)  - Arrange for interpreters to assist at discharge as needed  - Consider post-discharge preferences of patient/family/discharge partner  - Complete POLST form as appropriate  - Assess patient's ability to be responsible for managing their own health  - Refer to Case Management Department for coordinating discharge planning if the patient needs post-hospital services based on physician/LIP order or complex needs related to functional status, cognitive ability or social support system  Outcome: Progressing     Patient alert to self.  Frequent reorientation and redirection provided.  Call light within reach.  Bed locked and in lowest position, bed alarm on.  IV fluids continued.  PRN Tylenol for pain.  Plan for MRI, 2D echo.

## 2024-08-30 NOTE — CONSULTS
[facility]  [address] [place] [address], [address]  [facility] [address]  [phone]            Karen Finney Patient Status:  Inpatient    1935 MRN [id number]   Location [facility] 3W/SW Attending Josse Fraire MD   Hosp Day # 1 PCP LORENZO AYALA MD     Date of Admission:  2024  Date of Consult:  2024  Reason for Consultation:   Confusion    History of Present Illness:   Patient is a 89 year old female history of cognitive impairment, CAD status post stent, atrial fibrillation, hypertension, hyperlipidemia who was admitted to the hospital after being found down by her brother.  Had last been seen well several days ago-but brother at bedside states that he has overall seen a gradual decline in her functioning over the last year.  States that she has had forgetfulness for many years but has been able to live independently.  He would help her and check on her frequently, for example he would  her medicines from the pharmacy when she asked him to.  However, over the last few months he has noted changes in her behavior.  For example, she stopped calling him to  her medicines.  At first he thought that she had changed it to mail-order, but this week he realized that she had not been getting it at all, and maybe not even taking her medicines at all.  Previously, he would help for to go to the grocery store sometimes, but she would be able to order food frequently on her own for delivery.  Not sure what she has been eating or if she has been capable of ordering delivery recently.    Past Medical History  Past Medical History:    ASHD (arteriosclerotic heart disease)    Concussion    from fall    Essential hypertension    Gastric ulcer without hemorrhage or perforation, unspecified chronicity    Glaucoma    Gout    HTN (hypertension)    Hyperlipidemia    OA (osteoarthritis)    Osteopenia    last dexa 2015 Frax score 14% and 3.5%  Frax 2017 155% and  4.7%       Past Surgical History  Past Surgical History:   Procedure Laterality Date    Breast biopsy      Cataract extraction Bilateral     Hysterectomy      Knee arthroscopy Right     Other surgical history Left 2013    L femur fracture with redo    Pt w/ coronary artery stent  2012    bare metal       Family History  Family History   Problem Relation Age of Onset    Heart Disorder Father         MI    Heart Disorder Brother         ASHD       Social History  Pediatric History   Patient Parents    Not on file     Other Topics Concern    Not on file   Social History Narrative    Not on file           Current Medications:  Current Facility-Administered Medications   Medication Dose Route Frequency    sodium chloride 0.9% infusion   Intravenous Continuous    ondansetron (Zofran) 4 MG/2ML injection 4 mg  4 mg Intravenous Q6H PRN    hydrALAzine (Apresoline) 20 mg/mL injection 10 mg  10 mg Intravenous Q3H PRN    apixaban (Eliquis) tab 2.5 mg  2.5 mg Oral BID    losartan (Cozaar) tab 50 mg  50 mg Oral Daily    metoprolol tartrate (Lopressor) tab 100 mg  100 mg Oral 2x Daily(Beta Blocker)    OLANZapine (Zyprexa) 5 mg in sterile water for injection (PF) IM injection  5 mg Intramuscular Q12H PRN    acetaminophen (Tylenol Extra Strength) tab 500 mg  500 mg Oral Q4H PRN    lidocaine-menthol 4-1 % patch 1 patch  1 patch Transdermal Daily     Medications Prior to Admission   Medication Sig    Potassium Chloride ER 20 MEQ Oral Tab CR Take 20 mEq by mouth daily.    ergocalciferol 1.25 MG (69159 UT) Oral Cap Take 1 capsule (50,000 Units total) by mouth once a week.    rosuvastatin 10 MG Oral Tab Take 1 tablet (10 mg total) by mouth nightly.    levothyroxine 25 MCG Oral Tab Take 1 tablet (25 mcg total) by mouth every morning before breakfast.    allopurinol 100 MG Oral Tab Take 1 tablet (100 mg total) by mouth daily.    donepezil 5 MG Oral Tab Take 1 tablet (5 mg total) by mouth nightly.    apixaban (ELIQUIS) 2.5 MG Oral Tab Take 1  tablet (2.5 mg total) by mouth 2 (two) times daily.    HYDROcodone-acetaminophen 5-325 MG Oral Tab Take 1 tablet by mouth every 8 (eight) hours as needed.    losartan 100 MG Oral Tab Take 0.5 tablets (50 mg total) by mouth daily.    HYDROcodone-acetaminophen 5-325 MG Oral Tab TAKE 1/2 TO 1 TABLET BY MOUTH DAILY AS NEEDED FOR PAIN    Levobunolol HCl 0.5 % Ophthalmic Solution     Coenzyme Q10 (CO Q-10) 200 MG Oral Cap Take by mouth.    Turmeric (QC TUMERIC COMPLEX OR) Take 400 mg by mouth.    Omega-3 Fatty Acids (OMEGA-3 2100 OR) Take by mouth.    Ginger, Zingiber officinalis, (GINGER OR) Take 450 mg by mouth daily.    Miconazole Nitrate 2 % External Powder Apply topically as needed for Itching.    aspirin 81 MG Oral Tab Take 1 tablet (81 mg total) by mouth daily.    nitroGLYCERIN 0.4 MG Sublingual SL Tab Place 1 tablet (0.4 mg total) under the tongue every 5 (five) minutes as needed for Chest pain.    latanoprost (XALATAN) 0.005 % Ophthalmic Solution One droplet in each eye QHS.        Allergies  Allergies   Allergen Reactions    Streptomycin OTHER (SEE COMMENTS)     numbness    Amoxicillin     Bee Venom     Penicillins     Vioxx [Rofecoxib]        Review of Systems:   As in HPI, the rest of the 14 system review was done and was negative    Physical Exam:     Vitals:    08/29/24 2034 08/30/24 0428 08/30/24 0541 08/30/24 1027   BP: 107/57 130/85  (!) 153/93   Pulse: (!) 137 98  76   Resp: 22 20  18   Temp: 98 °F (36.7 °C) 98.4 °F (36.9 °C)  97.5 °F (36.4 °C)   TempSrc: Oral Oral  Axillary   SpO2: 96% 96%  98%   Weight:   124 lb 12.8 oz (56.6 kg)        General: No apparent distress    Neurological:     Mental Status:  Alert, conversational, following all commands.  Speech fluent     Cranial Nerves:  II.- Visual fields full to confrontation, PERLL,  III, IV, VI- EOM intact, V. Facial sensation intact, VII. Face symmetric, no facial weakness, and IX. Palate elevates symmetrically.    Motor Exam:  Strength- upper  extremities 5/5 proximally and distally    - lower  extremities 4/5 hip flexion, limited by pain L>R.  Otherwise 5/5 in BLE      Sensory Exam:  Light touch- Intact in all 4 limbs    Deep Tendon Reflexes:  Biceps 2+ bilateral symmetric  Triceps 2+ bilateral symmetric  Brachioradialis 2 + bilateral symmetric  Absent BLE  Toes downgoing    Coordination:  Finger to nose intact  No abnormal movements    Gait:  Refused to stand, but says she can    Results:     Laboratory Data:  Lab Results   Component Value Date    WBC 6.0 08/30/2024    HGB 11.4 (L) 08/30/2024    HCT 34.5 (L) 08/30/2024    .0 08/30/2024    CREATSERUM 1.08 (H) 08/30/2024    BUN 17 08/30/2024     08/30/2024    K 3.2 (L) 08/30/2024    K 3.2 (L) 08/30/2024     (H) 08/30/2024    CO2 22.0 08/30/2024    GLU 92 08/30/2024    CA 9.2 08/30/2024    ALB 3.9 08/29/2024    ALKPHO 67 08/29/2024    TP 6.9 08/29/2024    AST 42 (H) 08/29/2024    ALT 11 08/29/2024    TSH 2.438 08/29/2024    GGT 44 11/07/2017    MG 1.9 08/29/2024     (H) 08/29/2024    B12 1,294 (H) 08/19/2024         Imaging:    XR PELVIS (1 VIEW) (CPT=72170)    Result Date: 8/29/2024  CONCLUSION:  1. No acute fracture or dislocation. 2. Chronic healed proximal left femoral fracture with partial visualization of postoperative changes related to a previous ORIF. 3. Stable mild hip joint osteoarthritis bilaterally. 4. Lesser incidental findings as above.    Dictated by (CST): Dominik Easton MD on 8/29/2024 at 4:11 PM     Finalized by (CST): Dominik Easton MD on 8/29/2024 at 4:14 PM          CT BRAIN OR HEAD (CPT=70450)    Result Date: 8/29/2024  CONCLUSION:  1. No acute intracranial hemorrhage.  No acute intracranial CT abnormalities 2. Cerebral cortical atrophy, prominently frontal temporal regions. 3. Mild cerebellar atrophy age appropriate. 4. Asymmetric soft tissue subcutaneous swelling/calvarial hematoma right temporal parietal and left posterior parietal region has  developed suggesting subacute swelling/hematoma 5. Chronic appearing left mastoid effusions.     Dictated by (CST): Houston Bradley MD on 8/29/2024 at 2:35 PM     Finalized by (CST): Houston Bradley MD on 8/29/2024 at 2:46 PM          XR CHEST AP PORTABLE  (CPT=71045)    Result Date: 8/29/2024  CONCLUSION:  1. No acute disease in the chest.    Dictated by (CST): Benito Diaz MD on 8/29/2024 at 1:22 PM     Finalized by (CST): Benito Diaz MD on 8/29/2024 at 1:23 PM         EKG 12 Lead    Result Date: 8/29/2024  Atrial fibrillation with rapid ventricular response Nonspecific ST and T wave abnormality Abnormal ECG No previous ECGs found in [facility] Confirmed by TIM CARY ([id number]) on 8/29/2024 3:07:14 PM       Impression:     Assessment   Karen Finney is a 89 year old female history of cognitive impairment, CAD status post stent, atrial fibrillation, hypertension, hyperlipidemia who was admitted to the hospital after being found down by her brother.  Had last been seen well several days ago-but brother at bedside states that he has overall seen a gradual decline in her functioning over the last year.  Realizing now that she may not have been taking her medicines correctly or at all in recent months.      Overall, seems like there is a more chronic cognitive impairment with progression noted over the past year which has made it difficult for her to manage on her own.  Exam currently is nonfocal making stroke less likely, but she has been off all of her medicines so cannot fully be excluded    1. Fall, gradual progressive cognitive decline  -Likely progression of underlying dementia  -Obtain MRI brain noncontrast to exclude stroke  -Okay to continue Eliquis, donepezil  -LDL elevated at 219, if MRI brain shows stroke consider adding a atorvastatin   -A1c normal  -CTA head and neck and TTE if stroke is seen  -PT, OT and speech therapy   -Blood pressure goals: Normotension   -patient was not a thrombolysis  nor thrombectomy candidate because out of the window       Thank you for allowing me to participate in the care of your patient.    Staci Linder MD  8/30/2024

## 2024-08-30 NOTE — PROGRESS NOTES
[facility]  Cardiology Progress Note    Karen Finney Patient Status:  Inpatient    1935 MRN [id number]   Location [facility] 3W/SW Attending Josse Fraire MD   Hosp Day # 1 PCP LORENZO AYALA MD     Subjective     Tachycardic yesterday evening, received oral metoprolol 100 mg and now rate controlled.  No cardiac complaints.    Objective:   Patient Vitals for the past 24 hrs:   BP Temp Temp src Pulse Resp SpO2 Weight   24 0541 -- -- -- -- -- -- 124 lb 12.8 oz (56.6 kg)   24 0428 130/85 98.4 °F (36.9 °C) Oral 98 20 96 % --   24 2034 107/57 98 °F (36.7 °C) Oral (!) 137 22 96 % --   24 -- -- -- (!) 160 -- -- --   24 1915 -- -- -- (!) 160 -- -- --   24 1900 -- -- -- (!) 131 -- -- --   24 1849 (!) 154/95 -- -- -- -- -- --   24 1752 (!) 184/119 -- -- -- 18 97 % 122 lb 11.2 oz (55.7 kg)   24 1730 (!) 156/98 -- -- 77 17 98 % --   24 1700 141/88 -- -- 79 20 96 % --   24 1515 (!) 150/94 -- -- 86 18 97 % --   24 1445 (!) 165/98 -- -- 96 18 100 % --   24 1400 (!) 146/91 -- -- 72 16 97 % --   24 1345 158/83 -- -- 68 18 98 % --   24 1330 (!) 153/96 -- -- 88 13 100 % --   24 1315 (!) 150/92 -- -- 88 13 99 % --   24 1214 -- 98.2 °F (36.8 °C) Oral -- -- -- --   24 1211 (!) 168/108 -- -- 116 18 98 % --     Intake/Output:   Last 3 shifts:   Intake/Output                   24 07 - 24 0659 (Not Admitted) 24 07 - 24 - 24 0659       Intake    P.O.  --  100  --    P.O. -- 100 --    I.V.  --  1498  --    I.V. -- 10 --    Volume (mL) (sodium chloride 0.9% infusion) -- 488 --    Volume (mL) (sodium chloride 0.9 % IV bolus 1,000 mL) -- 1000 --    IV PIGGYBACK  --  250  --    Volume (mL) (potassium chloride 40 mEq in 250mL sodium chloride 0.9% IVPB premix) -- 250 --    Total Intake -- 1848 --       Output    Urine  --  30  --     Intermittent/Straight Cath (mL) -- 30 --    Output (mL) (External Urinary Catheter) -- 0 --    Total Output -- 30 --       Net I/O     -- 1818 --             Scheduled Meds:    potassium chloride  40 mEq Oral Once    apixaban  2.5 mg Oral BID    losartan  50 mg Oral Daily    metoprolol tartrate  100 mg Oral 2x Daily(Beta Blocker)    lidocaine-menthol  1 patch Transdermal Daily     Continuous Infusions:    sodium chloride 100 mL/hr at 08/29/24 1807     Results:     Lab Results   Component Value Date    WBC 6.0 08/30/2024    HGB 11.4 (L) 08/30/2024    HCT 34.5 (L) 08/30/2024    .0 08/30/2024    CREATSERUM 1.08 (H) 08/30/2024    BUN 17 08/30/2024     08/30/2024    K 3.2 (L) 08/30/2024    K 3.2 (L) 08/30/2024     (H) 08/30/2024    CO2 22.0 08/30/2024    GLU 92 08/30/2024    CA 9.2 08/30/2024    ALB 3.9 08/29/2024    ALKPHO 67 08/29/2024    BILT 1.8 (H) 08/29/2024    TP 6.9 08/29/2024    AST 42 (H) 08/29/2024    ALT 11 08/29/2024    TSH 2.438 08/29/2024    GGT 44 11/07/2017    MG 1.9 08/29/2024     (H) 08/29/2024    B12 1,294 (H) 08/19/2024       Recent Labs   Lab 08/29/24  1216 08/29/24  2257 08/30/24  0726   GLU 97  --  92   BUN 17  --  17   CREATSERUM 1.27*  --  1.08*   CA 10.4  --  9.2     --  144   K 3.0* 3.2* 3.2*  3.2*     --  115*   CO2 25.0  --  22.0     Recent Labs   Lab 08/29/24  1216 08/30/24  0726   RBC 5.09 3.90   HGB 14.8 11.4*   HCT 44.4 34.5*   MCV 87.2 88.5   MCH 29.1 29.2   MCHC 33.3 33.0   RDW 14.8 15.2*   NEPRELIM 5.31 3.96   WBC 7.0 6.0   .0 232.0       No results for input(s): \"BNPML\" in the last 168 hours.    Recent Labs   Lab 08/29/24  1216   *       XR PELVIS (1 VIEW) (CPT=72170)    Result Date: 8/29/2024  CONCLUSION:  1. No acute fracture or dislocation. 2. Chronic healed proximal left femoral fracture with partial visualization of postoperative changes related to a previous ORIF. 3. Stable mild hip joint osteoarthritis bilaterally. 4. Lesser  incidental findings as above.    Dictated by (CST): Dominik Easton MD on 8/29/2024 at 4:11 PM     Finalized by (CST): Dominik Easton MD on 8/29/2024 at 4:14 PM          CT BRAIN OR HEAD (CPT=70450)    Result Date: 8/29/2024  CONCLUSION:  1. No acute intracranial hemorrhage.  No acute intracranial CT abnormalities 2. Cerebral cortical atrophy, prominently frontal temporal regions. 3. Mild cerebellar atrophy age appropriate. 4. Asymmetric soft tissue subcutaneous swelling/calvarial hematoma right temporal parietal and left posterior parietal region has developed suggesting subacute swelling/hematoma 5. Chronic appearing left mastoid effusions.     Dictated by (CST): Houston Bradley MD on 8/29/2024 at 2:35 PM     Finalized by (CST): Houston Bradley MD on 8/29/2024 at 2:46 PM          XR CHEST AP PORTABLE  (CPT=71045)    Result Date: 8/29/2024  CONCLUSION:  1. No acute disease in the chest.    Dictated by (CST): Benito Diaz MD on 8/29/2024 at 1:22 PM     Finalized by (CST): Benito Diaz MD on 8/29/2024 at 1:23 PM         EKG 12 Lead    Result Date: 8/29/2024  Atrial fibrillation with rapid ventricular response Nonspecific ST and T wave abnormality Abnormal ECG No previous ECGs found in [facility] Confirmed by TIM CARY ([id number]) on 8/29/2024 3:07:14 PM          Exam:     General: Alert and oriented x 1. No apparent distress.   HEENT: Normocephalic, anicteric sclera, neck supple, no thyromegaly or adenopathy.  Neck: No JVD, carotids 2+, no bruits.  Cardiac: Irregularly irregular rhythm, normal rate. S1, S2 normal. No murmur, pericardial rub, S3, or extra cardiac sounds.  Lungs: Clear without wheezes, rales, rhonchi or dullness.  Normal excursions and effort.  Abdomen: Soft, non-tender. No organosplenomegally, mass or rebound, BS-present.  Extremities: Without clubbing or cyanosis. No edema.    Neurologic: Alert, normal affect. No focal defects  Skin: Warm and dry.     Assessment and Plan:   Confusion,  weakness  - Apparently living at home alone, inability to take care of herself  - Mildly elevated CK level, last seen well several days prior  - CT head with evidence of subcutaneous hematoma, no fracture  - Further workup/management per primary service     Atrial fibrillation  - Mildly elevated rates, 90s to low 100s  - Continue metoprolol tartrate 100 mg twice daily, Eliquis 2.5 mg twice daily  - May need to reassess risk of long-term anticoagulation given fall risk and age     CAD s/p PCI 2012  - No chest pain or shortness of breath  - Restart aspirin and statin     HTN  - Continue losartan 50 mg daily     HLD  - Restart home rosuvastatin    Kevin Peña MD  [facility]  8/30/2024

## 2024-08-30 NOTE — PHYSICAL THERAPY NOTE
PHYSICAL THERAPY EVALUATION - INPATIENT     Room Number: 308/308-A  Evaluation Date: 8/30/2024  Type of Evaluation: Initial   Physician Order: PT Eval and Treat    Presenting Problem: fall, inability to care for self, acute encephalopathy  Co-Morbidities : essential HTN, OA, dementia, peptic ulcer disease, gout, hyperlipidemia, hypothyroidism, paroxysmal atrial fibrillation, glaucoma  Reason for Therapy: Mobility Dysfunction and Discharge Planning    PHYSICAL THERAPY ASSESSMENT   Patient is a 89 year old female admitted 8/29/2024 for fall, inability to care for self, acute encephalopathy. Prior to admission, patient's baseline is living alone - Mod I for functional mobility with use of cane inside her apartment, RW outside of her apartment. Per brother, patient typically Mod I > Independent with ADLs. Patient is currently functioning below baseline with bed mobility, transfers, gait, maintaining seated position, standing prolonged periods, and performing household tasks.  Patient is requiring moderate assist x 1-2 as a result of the following impairments: decreased functional strength, decreased endurance/aerobic capacity, pain, impaired sitting and standing balance, impaired motor planning, decreased muscular endurance, cognitive deficits (disoriented), and medical status.  Physical Therapy will continue to follow for duration of hospitalization.    Patient will benefit from continued skilled PT Services to promote return to prior level of function and safety with continuous assistance and gradual rehabilitative therapy with likely transition to LTC.    PLAN  PT Treatment Plan: Bed mobility;Body mechanics;Coordination;Endurance;Energy conservation;Patient education;Gait training;Strengthening;Transfer training;Balance training  Rehab Potential : Guarded  Frequency (Obs): 3x/week    PHYSICAL THERAPY MEDICAL/SOCIAL HISTORY     Problem List  Principal Problem:    Fall, initial encounter  Active Problems:    Fall     Weakness generalized    Hypokalemia    Acute encephalopathy      HOME SITUATION  Home Situation  Type of Home: Apartment  Home Layout: One level  Lives With: Alone  Drives: No  Patient Owned Equipment: Rolling walker;Cane  Patient Regularly Uses: None    SUBJECTIVE  \"This is my puppy\"    PHYSICAL THERAPY EXAMINATION   OBJECTIVE  Precautions: Bed/chair alarm  Fall Risk: High fall risk    WEIGHT BEARING RESTRICTION  Weight Bearing Restriction: None    PAIN ASSESSMENT  Rating: Unable to rate  Location: LLE  Management Techniques: Activity promotion;Body mechanics;Repositioning    COGNITION  Overall Cognitive Status:  Impaired  Following Commands:  follows one step commands with increased time and follows one step commands with repetition    RANGE OF MOTION AND STRENGTH ASSESSMENT  Upper extremity ROM and strength are within functional limits   Lower extremity ROM is within functional limits   Lower extremity strength is impaired; unable to formally assessed due to impaired cognition.     BALANCE  Static Sitting: Fair -  Dynamic Sitting: Poor +  Static Standing: Poor  Dynamic Standing: Poor -    AM-PAC '6-Clicks' INPATIENT SHORT FORM - BASIC MOBILITY  How much difficulty does the patient currently have...  Patient Difficulty: Turning over in bed (including adjusting bedclothes, sheets and blankets)?: A Lot   Patient Difficulty: Sitting down on and standing up from a chair with arms (e.g., wheelchair, bedside commode, etc.): A Lot   Patient Difficulty: Moving from lying on back to sitting on the side of the bed?: A Lot   How much help from another person does the patient currently need...   Help from Another: Moving to and from a bed to a chair (including a wheelchair)?: A Lot   Help from Another: Need to walk in hospital room?: A Lot   Help from Another: Climbing 3-5 steps with a railing?: Total     AM-PAC Score:  Raw Score: 11   Approx Degree of Impairment: 72.57%   Standardized Score (AM-PAC Scale): 33.86   CMS  Modifier (G-Code): CL    FUNCTIONAL ABILITY STATUS  Functional Mobility/Gait Assessment  Gait Assistance: Moderate assistance (A x 2)  Distance (ft): 2 ft bed>chair  Assistive Device: Rolling walker  Pattern: Shuffle (decreased varsha speed, decreased step length, posterior lean, unsteady - no overt LOB. Cues provided for step iniation and sequencing.)  Supine to Sit: moderate assist. Patient tolerated static sitting EOB approx 5 minutes with Min A>CGA in order to maintain static sitting balance.  Sit to Stand: moderate assist from EOB with RW. Patient with initial posterior lean requiring increased cues and assistance to correct.    Exercise/Education Provided:  Bed mobility  Body mechanics  Energy conservation  Functional activity tolerated  Gait training  Posture  Transfer training    Skilled Therapy Provided: Patient in bed with RN and brother upon arrival. RN approved activity. Educated patient on POC and benefits of mobilization. Agreeable to participate. Patient reporting LLE pain, not quantified, per the pain scale. Patient confused throughout session, intermittently agitated/annoyed with therapists, however, easily re-directed. Patient benefits from increased time, cues, reassurance, and physical assistance of 1-2 people in order to safely complete functional mobility tasks.    The patient's Approx Degree of Impairment: 72.57% has been calculated based on documentation in the [facility] '6 clicks' Inpatient Basic Mobility Short Form.  Research supports that patients with this level of impairment may benefit from rehab.  Final disposition will be made by interdisciplinary medical team.    Patient End of Session: Up in chair;With  staff;Needs met;Call light within reach;RN aware of session/findings;All patient questions and concerns addressed;Alarm set;Family present    CURRENT GOALS  Goals to be met by: 9/13/24  Patient Goal Patient's self-stated goal is: none stated   Goal #1 Patient is able to demonstrate  supine - sit EOB @ level: minimum assistance     Goal #1   Current Status    Goal #2 Patient is able to demonstrate transfers Sit to/from Stand at assistance level: minimum assistance with walker - rolling     Goal #2  Current Status    Goal #3 Patient is able to ambulate 30 feet with assist device: walker - rolling at assistance level: minimum assistance   Goal #3   Current Status    Goal #4 Patient to demonstrate independence with home activity/exercise instructions provided to patient in preparation for discharge.   Goal #4   Current Status      Patient Evaluation Complexity Level:  History Moderate - 1 or 2 personal factors and/or co-morbidities   Examination of body systems Moderate - addressing a total of 3 or more elements   Clinical Presentation  Moderate - Evolving   Clinical Decision Making  Moderate Complexity     Therapeutic Activity:  14 minutes

## 2024-08-30 NOTE — CM/SW NOTE
24 1400   CM/SW Referral Data   Referral Source Social Work (self-referral)   Reason for Referral Discharge planning   Informant Sibling  (brother/Dameon)   Medical Hx   Does patient have an established PCP? Yes  (Zonia Serrato)   Patient Info   Advanced directives? No   Information provided? [place]  ([place])   Patient's Current Mental Status at Time of Assessment Confused or unable to complete assessment;Memory Impairments  (AOX1)   Patient's Home Environment Condo/Apt no elevator   Number of Levels in Home 1   Number of Stair in Home 0   Patient lives with Alone   Patient Status Prior to Admission   Independent with ADLs and Mobility No   Pt. requires assistance with Ambulating;Driving   Services in place prior to admission DME/Supplies at home   Type of DME/Supplies Straight Cane;Standard Walker   Discharge Needs   Anticipated D/C needs Subacute rehab   Services Requested   Submitted to [facility] Yes   PASRR Level 1 Submitted Yes   Choice of Post-Acute Provider   Informed patient of right to choose their preferred provider Yes   List of appropriate post-acute services provided to patient/family with quality data   (REYNALDO ref pending in Aidin - needs f/up)       02:45PM  SW self referred pt for DC Planning after RN rounds report this AM that pt is AOX1 and living alone.    Met w/ pt's brother Dameon outside pt's room. Above assessment completed.    Confirmed pt has been living alone in an apt w/ 0 steps to enter.   Per Dameon, pt uses a cane inside the home for ambulation and a walker outside the home.    Dameon confirmed pt does not drive. Her license   and she did have his car. He is planning to pick it up from her apt.    Confirmed pt was recently dx'd w/ Dementia - they have had suspicion for a little bit. Pt's brother cited poor hygiene and pt being afraid to get up b/c she did not want to fall.    Pt is close to him and their first cousin Nimo. Per Dameon's request -  Nimo's info has been added to pt's emergency contact list.    Pt's brother inquired about POA HC and said pt does not have one. [place].    [place] order as follows:     1) the patient's guardian of the person;   2) the patient's spouse;   3) any adult son or daughter of the patient;   4) either parent of the patient;   5) any adult brother or sister of the patient;   6) any adult grandchild of the patient;   7) a close friend of the patient;   8) the patient's guardian of the estate.    Pt's brother confirmed pt has hx w/ REYNALDO at [facility] a few years ago. He is agreeable to REYNALDO again for her and would be happy w/ her going back to [facility] if they can accept.    SW discussed plans for post REYNALDO. SW did inform pt's brother that pt would likely not be safe to return home alone. He confirmed and informed SW that he and their cousin plan to clean her apt while pt is at [facility] and REYNALDO.    SW provided information for liaison Kathi w/ [facility]. Pt's brother heavily encouraged to reach out to Kathi to begin process of finding jail or Memory Care.     Pt's brother confirmed pt has 2 \"small pensions\" and Social Security. He is not sure about her other finances. (Pt likely would not qualify for Medicaid based on this information).    REYNALDO referrals sent in Aidin. PASRR completed/uploaded. [facility] sent.    04:05PM  REYNALDO list of quality data generated and printed. Provided REYNALDO list to pt's brother at bedside. Data explained and f/up instructions provided.    No questions at this time.    PLAN: REYNALDO - pending choice & med clear      SW/CM to remain available for support and/or discharge planning.         Emily, MSW, LSW [id number]

## 2024-08-30 NOTE — PROGRESS NOTES
[facility]  part of [facility]    Progress Note    Karen Finney Patient Status:  Inpatient    1935 MRN [id number]   Location [facility] 3W/SW Attending Josse Fraire MD   Hosp Day # 1 PCP LORENZO AYALA MD       Subjective:   Karen Finney is a(n) 89 year old female  admitted after syncope/fall.    Pt seen and examined. No acute issues    Objective:   Blood pressure 143/79, pulse 96, temperature 98 °F (36.7 °C), temperature source Axillary, resp. rate 18, weight 124 lb 12.8 oz (56.6 kg), SpO2 100%, not currently breastfeeding.    GENERAL:  The patient appeared to be in no distress and was comfortable.  SKIN:  Warm and hydrated  PSYCHIATRIC: Calm and cooperative   HEENT:  Head was atraumatic and normocephalic.  Eyes: Sclera was anicteric.  Pupils were equal.  Ears:  There were no lesions.  Nose:  No lesions were noted.     NECK:  Supple.  There was no JVD.   CVS:  S1S2 RRR no murmurs  LUNGS:  No audible wheezing, no crackles  ABDOMEN: Non-distended NT  MUSCULOSKELETAL:  There was no deformity.  ROM not assessed.   EXTREMITIES: There was no edema, clubbing or cyanosis  NEUROLOGICAL:  There was no focal deficit.       apixaban  2.5 mg Oral BID    losartan  50 mg Oral Daily    metoprolol tartrate  100 mg Oral 2x Daily(Beta Blocker)    lidocaine-menthol  1 patch Transdermal Daily       Results:     Lab Results   Component Value Date    WBC 6.0 2024    HGB 11.4 (L) 2024    HCT 34.5 (L) 2024    .0 2024    CREATSERUM 1.08 (H) 2024    BUN 17 2024     2024    K 3.2 (L) 2024    K 3.2 (L) 2024     (H) 2024    CO2 22.0 2024    GLU 92 2024    CA 9.2 2024    ALB 3.9 2024    ALKPHO 67 2024    BILT 1.8 (H) 2024    TP 6.9 2024    AST 42 (H) 2024    ALT 11 2024    TSH 2.438 2024    GGT 44 2017    MG 1.9 2024     (H) 2024    B12  1,294 (H) 08/19/2024       XR PELVIS (1 VIEW) (CPT=72170)    Result Date: 8/29/2024  CONCLUSION:  1. No acute fracture or dislocation. 2. Chronic healed proximal left femoral fracture with partial visualization of postoperative changes related to a previous ORIF. 3. Stable mild hip joint osteoarthritis bilaterally. 4. Lesser incidental findings as above.    Dictated by (CST): Dominik Easton MD on 8/29/2024 at 4:11 PM     Finalized by (CST): Dominik Easton MD on 8/29/2024 at 4:14 PM          CT BRAIN OR HEAD (CPT=70450)    Result Date: 8/29/2024  CONCLUSION:  1. No acute intracranial hemorrhage.  No acute intracranial CT abnormalities 2. Cerebral cortical atrophy, prominently frontal temporal regions. 3. Mild cerebellar atrophy age appropriate. 4. Asymmetric soft tissue subcutaneous swelling/calvarial hematoma right temporal parietal and left posterior parietal region has developed suggesting subacute swelling/hematoma 5. Chronic appearing left mastoid effusions.     Dictated by (CST): Houston Bradley MD on 8/29/2024 at 2:35 PM     Finalized by (CST): Houston Bradley MD on 8/29/2024 at 2:46 PM          XR CHEST AP PORTABLE  (CPT=71045)    Result Date: 8/29/2024  CONCLUSION:  1. No acute disease in the chest.    Dictated by (CST): Benito Diaz MD on 8/29/2024 at 1:22 PM     Finalized by (CST): Benito Diaz MD on 8/29/2024 at 1:23 PM         EKG 12 Lead    Result Date: 8/29/2024  Atrial fibrillation with rapid ventricular response Nonspecific ST and T wave abnormality Abnormal ECG No previous ECGs found in [facility] Confirmed by TIM CARY ([id number]) on 8/29/2024 3:07:14 PM     Assessment and Plan:    1.       Acute encephalopathy.  2.       Underlying atrial fibrillation.   3.       Essential hypertension.  4.       Chronic kidney disease stage 3.     PLAN:  Patient will be admitted to telemetry floor.  Obtain MRI scan of the brain, 2D echocardiogram with Doppler f/u recs.  Resume her blood pressure  medications and anticoagulation.  Fall precautions.  F/u further cardio and neuor recs.   Continue aidee, eliquis? Fall risk. Statin. PT/OT ST.                Code Status: Prior  DVT Prophylaxis:shalom Fraire M.D.  **Certification      PHYSICIAN Certification of Need for Inpatient Hospitalization - Initial Certification    Patient will require inpatient services that will reasonably be expected to span two midnight's based on the clinical documentation in H+P.   Based on patients current state of illness, I anticipate that, after discharge, patient will require TBD.

## 2024-08-30 NOTE — OCCUPATIONAL THERAPY NOTE
OCCUPATIONAL THERAPY EVALUATION - INPATIENT     Room Number: 308/308-A  Evaluation Date: 8/30/2024  Type of Evaluation: Initial  Presenting Problem: fall (found on floor by brother during wellness check), generalized weakness, acute encephalopathy, generalized weakness, hypokalemia  Hx recent dx dementia, CAD s/p PCI 2012, a fib, HTN, CKD3     Physician Order: IP Consult to Occupational Therapy  Reason for Therapy: ADL/IADL Dysfunction and Discharge Planning    OCCUPATIONAL THERAPY ASSESSMENT   Patient is a 89 year old female admitted 8/29/2024 for fall (found on floor by brother during wellness check), generalized weakness, acute encephalopathy, generalized weakness, hypokalemia.  Per patient's brother, patient is independent at baseline with basic ADLs and MI for fx mobility using cane in home and RW in community. Per chart, patient \"was recently dx with dementia [and] was recommended nursing home.\"     Patient is currently functioning below baseline with ADLs and fx mobility/transfers.  Patient is requiring up to max assist for ADLs as a result of the following impairments: decreased functional strength, decreased functional reach, decreased endurance, pain, impaired balance, decreased muscular endurance, cognitive deficits, decreased insight to deficits, and decreased safety awareness. Occupational Therapy will continue to follow for duration of hospitalization.    Patient will benefit from continued skilled OT Services to promote return to prior level of function and safety with continuous assistance and gradual rehabilitative therapy. Recommend consideration for higher level of assistance/supervision after gradual rehabilitative therapy in order to ensure continued patient safety and fall prevention.    PLAN  OT Treatment Plan: Balance activities;Energy conservation/work simplification techniques;ADL training;Functional transfer training;Patient/Family education;Patient/Family training;Equipment  eval/education;Compensatory technique education;Cognitive reorientation;Endurance training  OT Device Recommendations: TBD    OCCUPATIONAL THERAPY MEDICAL/SOCIAL HISTORY   Problem List  Principal Problem:    Fall, initial encounter  Active Problems:    Fall    Weakness generalized    Hypokalemia    Acute encephalopathy    HOME SITUATION  Type of Home: Apartment  Home Layout: One level  Lives With: Alone  Drives: No  Patient Regularly Uses: None  Stairs in Home: 0  Use of Assistive Device(s): Cane, RW    SUBJECTIVE  \"This is Puppy. He's gerald!\" (Patient referring to her stuffed animal dog)    OCCUPATIONAL THERAPY EXAMINATION    OBJECTIVE  Precautions: Bed/chair alarm; Aspiration  Fall Risk: High fall risk    PAIN ASSESSMENT  Rating: -- (not rated)  Location: L hip  Management Techniques: Body mechanics; Activity promotion; Repositioning    COGNITION  Overall Cognitive Status:  Impaired  Orientation Level:  oriented to person, disoriented to place, disoriented to time, and disoriented to situation  Following Commands:  follows one step commands with repetition  Safety Judgement:  decreased awareness of need for assistance and decreased awareness of need for safety  Awareness of Deficits:  decreased awareness of deficits    SENSATION  Light touch:  intact    Behavioral/Emotional/Social: Disagreeable    RANGE OF MOTION   Upper extremity ROM is within functional limits     STRENGTH ASSESSMENT  Upper extremity strength is within functional limits     COORDINATION  Gross Motor: WFL   Fine Motor: WFL     ACTIVITIES OF DAILY LIVING ASSESSMENT  AM-PAC ‘6-Clicks’ Inpatient Daily Activity Short Form  How much help from another person does the patient currently need…  -   Putting on and taking off regular lower body clothing?: A Lot  -   Bathing (including washing, rinsing, drying)?: A Lot  -   Toileting, which includes using toilet, bedpan or urinal? : A Lot  -   Putting on and taking off regular upper body clothing?: A Lot  -    Taking care of personal grooming such as brushing teeth?: A Lot  -   Eating meals?: A Lot    AM-PAC Score:  Score: 12  Approx Degree of Impairment: 66.57%  Standardized Score (AM-PAC Scale): 30.6  CMS Modifier (G-Code): CL    BED MOBILITY  Supine to Sit: Mod assist x1    FUNCTIONAL TRANSFER ASSESSMENT  Sit to Stand from EOB: Mod assist x1  Chair Transfer: Mod assist x1 for controlled descent    FUNCTIONAL MOBILITY  Steps from EOB to Chair using RW: Mod assist x2  Comments: Demonstrated prominent posterior lean.    FUNCTIONAL ADL ASSESSMENT  Eating: min assist  Grooming: mod assist (per obs)   UB Dressing: mod assist (per obs)   LB Dressing: max assist  Toileting: max assist (per obs)     EDUCATION PROVIDED  Patient: Role of Occupational Therapy; Plan of Care; Discharge Recommendations; Functional Transfer Techniques; Fall Prevention; Posture/Positioning; Proper Body Mechanics  Patient's Response to Education: Demonstrates Poor Carry Over to Information    The patient's Approx Degree of Impairment: 66.57% has been calculated based on documentation in the [facility] '6 clicks' Inpatient Daily Activity Short Form.  Research supports that patients with this level of impairment may benefit from rehab.  Final disposition will be made by interdisciplinary medical team.     Patient End of Session: Up in chair;Needs met;Call light within reach;RN aware of session/findings;All patient questions and concerns addressed;Alarm set;Family present;With  staff    OT Goals  Patients self stated goal is: none stated     Patient will complete functional transfer with CGA  Comment:     Patient will tolerate standing for 2-3 minutes in prep for adls with CGA  Comment:    Patient will complete UB ADL in unsupported sitting with setup assist  Comment:          Goals  on: 24  Frequency: 3-5x/week    Patient Evaluation Complexity Level:   Occupational Profile/Medical History MODERATE - Expanded review of history including review of  medical or therapy record   Specific performance deficits impacting engagement in ADL/IADL HIGH  5+ performance deficits    Client Assessment/Performance Deficits HIGH - Comorbidities and significant modifications of tasks    Clinical Decision Making MODERATE - Analysis of occupational profile, detailed assessments, several treatment options    Overall Complexity MODERATE     OT Session Time  Therapeutic Activity: 13 minutes    Gloria Desouza OTR/L  [facility]  #[id number]

## 2024-08-30 NOTE — SPIRITUAL CARE NOTE
Spiritual Care Visit Note    Patient Name: Kaern Finney Date of Spiritual Care Visit: 24   : 1935 Primary Dx: Fall, initial encounter       Referred By:       Spoke to RN if the patient is decisional to make POA.  RN informed that pt is AMS and may not be decisional.  Referring to SW.      Visit Type/Summary:     - PoA: Patient unable to complete PoAH at this time: Vistited patient in response to PoA for Healthcare consult/request. Upon inquiry, identified that the patient may not be able to make decisions related to PoAH at this time. Reviewed the patient's EHR and paper chart. There is no documented PoAH on either. Reviewed Patient's paper chart to determine whether a surrogate decision maker has been identified. A completed surrogate decision maker form could not be located. Contacted Patient's  to let them know that a surrogate decision maker needs to be identified. Provided this update to the patient's nurse.   remains available for follow up.    Spiritual Care support can be requested via an Epic consult. For urgent/immediate needs, please contact the On Call  at: [place]: ext [phone]          Rev. Romina Mckeon

## 2024-08-31 ENCOUNTER — APPOINTMENT (OUTPATIENT)
Dept: MRI IMAGING | Facility: HOSPITAL | Age: 89
End: 2024-08-31
Attending: HOSPITALIST
Payer: MEDICARE

## 2024-08-31 LAB
ANION GAP SERPL CALC-SCNC: 10 MMOL/L (ref 0–18)
BASOPHILS # BLD AUTO: 0.05 X10(3) UL (ref 0–0.2)
BASOPHILS NFR BLD AUTO: 1.2 %
BUN BLD-MCNC: 12 MG/DL (ref 9–23)
BUN/CREAT SERPL: 12.6 (ref 10–20)
CALCIUM BLD-MCNC: 9.6 MG/DL (ref 8.7–10.4)
CHLORIDE SERPL-SCNC: 107 MMOL/L (ref 98–112)
CO2 SERPL-SCNC: 23 MMOL/L (ref 21–32)
CREAT BLD-MCNC: 0.95 MG/DL
DEPRECATED RDW RBC AUTO: 48.2 FL (ref 35.1–46.3)
EGFRCR SERPLBLD CKD-EPI 2021: 58 ML/MIN/1.73M2 (ref 60–?)
EOSINOPHIL # BLD AUTO: 0.07 X10(3) UL (ref 0–0.7)
EOSINOPHIL NFR BLD AUTO: 1.7 %
ERYTHROCYTE [DISTWIDTH] IN BLOOD BY AUTOMATED COUNT: 15.1 % (ref 11–15)
GLUCOSE BLD-MCNC: 87 MG/DL (ref 70–99)
HCT VFR BLD AUTO: 39.1 %
HGB BLD-MCNC: 12.9 G/DL
IMM GRANULOCYTES # BLD AUTO: 0.02 X10(3) UL (ref 0–1)
IMM GRANULOCYTES NFR BLD: 0.5 %
LYMPHOCYTES # BLD AUTO: 0.7 X10(3) UL (ref 1–4)
LYMPHOCYTES NFR BLD AUTO: 16.8 %
MAGNESIUM SERPL-MCNC: 1.5 MG/DL (ref 1.6–2.6)
MCH RBC QN AUTO: 29.1 PG (ref 26–34)
MCHC RBC AUTO-ENTMCNC: 33 G/DL (ref 31–37)
MCV RBC AUTO: 88.3 FL
MONOCYTES # BLD AUTO: 0.43 X10(3) UL (ref 0.1–1)
MONOCYTES NFR BLD AUTO: 10.3 %
NEUTROPHILS # BLD AUTO: 2.89 X10 (3) UL (ref 1.5–7.7)
NEUTROPHILS # BLD AUTO: 2.89 X10(3) UL (ref 1.5–7.7)
NEUTROPHILS NFR BLD AUTO: 69.5 %
OSMOLALITY SERPL CALC.SUM OF ELEC: 289 MOSM/KG (ref 275–295)
PLATELET # BLD AUTO: 233 10(3)UL (ref 150–450)
POTASSIUM SERPL-SCNC: 2.9 MMOL/L (ref 3.5–5.1)
POTASSIUM SERPL-SCNC: 2.9 MMOL/L (ref 3.5–5.1)
POTASSIUM SERPL-SCNC: 3.9 MMOL/L (ref 3.5–5.1)
RBC # BLD AUTO: 4.43 X10(6)UL
SODIUM SERPL-SCNC: 140 MMOL/L (ref 136–145)
WBC # BLD AUTO: 4.2 X10(3) UL (ref 4–11)

## 2024-08-31 PROCEDURE — 99233 SBSQ HOSP IP/OBS HIGH 50: CPT | Performed by: STUDENT IN AN ORGANIZED HEALTH CARE EDUCATION/TRAINING PROGRAM

## 2024-08-31 PROCEDURE — 99232 SBSQ HOSP IP/OBS MODERATE 35: CPT | Performed by: OTHER

## 2024-08-31 RX ORDER — METOPROLOL TARTRATE 1 MG/ML
5 INJECTION, SOLUTION INTRAVENOUS EVERY 6 HOURS
Status: DISCONTINUED | OUTPATIENT
Start: 2024-08-31 | End: 2024-09-02

## 2024-08-31 RX ORDER — POTASSIUM CHLORIDE 14.9 MG/ML
20 INJECTION INTRAVENOUS ONCE
Status: COMPLETED | OUTPATIENT
Start: 2024-08-31 | End: 2024-08-31

## 2024-08-31 RX ORDER — THIAMINE HYDROCHLORIDE 100 MG/ML
500 INJECTION, SOLUTION INTRAMUSCULAR; INTRAVENOUS 3 TIMES DAILY
Status: DISCONTINUED | OUTPATIENT
Start: 2024-08-31 | End: 2024-09-02

## 2024-08-31 RX ORDER — LORAZEPAM 2 MG/ML
1 INJECTION INTRAMUSCULAR ONCE AS NEEDED
Status: ACTIVE | OUTPATIENT
Start: 2024-08-31 | End: 2024-08-31

## 2024-08-31 RX ORDER — ROSUVASTATIN CALCIUM 10 MG/1
10 TABLET, COATED ORAL NIGHTLY
Status: DISCONTINUED | OUTPATIENT
Start: 2024-08-31 | End: 2024-09-01

## 2024-08-31 NOTE — PLAN OF CARE
No acute changes overnight. No complaints of pain. IVF infusing. Safety precautions maintained and in place. Plan for REYNALDO pending medical clearance.    Problem: Patient Centered Care  Goal: Patient preferences are identified and integrated in the patient's plan of care  Description: Interventions:  - What would you like us to know as we care for you? I'm from home alone  - Provide timely, complete, and accurate information to patient/family  - Incorporate patient and family knowledge, values, beliefs, and cultural backgrounds into the planning and delivery of care  - Encourage patient/family to participate in care and decision-making at the level they choose  - Honor patient and family perspectives and choices  Outcome: Progressing     Problem: Patient/Family Goals  Goal: Patient/Family Long Term Goal  Description: Patient's Long Term Goal: discharge    Interventions:  - Monitor vital signs, labs, imaging  - See additional Care Plan goals for specific interventions  Outcome: Progressing  Goal: Patient/Family Short Term Goal  Description: Patient's Short Term Goal: activity as tolerated    Interventions:   - PT/OT  - up to chair  - use of assistive devices  - See additional Care Plan goals for specific interventions  Outcome: Progressing     Problem: CARDIOVASCULAR - ADULT  Goal: Maintains optimal cardiac output and hemodynamic stability  Description: INTERVENTIONS:  - Monitor vital signs, rhythm, and trends  - Monitor for bleeding, hypotension and signs of decreased cardiac output  - Evaluate effectiveness of vasoactive medications to optimize hemodynamic stability  - Monitor arterial and/or venous puncture sites for bleeding and/or hematoma  - Assess quality of pulses, skin color and temperature  - Assess for signs of decreased coronary artery perfusion - ex. Angina  - Evaluate fluid balance, assess for edema, trend weights  Outcome: Progressing  Goal: Absence of cardiac arrhythmias or at baseline  Description:  INTERVENTIONS:  - Continuous cardiac monitoring, monitor vital signs, obtain 12 lead EKG if indicated  - Evaluate effectiveness of antiarrhythmic and heart rate control medications as ordered  - Initiate emergency measures for life threatening arrhythmias  - Monitor electrolytes and administer replacement therapy as ordered  Outcome: Progressing     Problem: NEUROLOGICAL - ADULT  Goal: Achieves stable or improved neurological status  Description: INTERVENTIONS  - Assess for and report changes in neurological status  - Initiate measures to prevent increased intracranial pressure  - Maintain blood pressure and fluid volume within ordered parameters to optimize cerebral perfusion and minimize risk of hemorrhage  - Monitor temperature, glucose, and sodium. Initiate appropriate interventions as ordered  Outcome: Progressing  Goal: Achieves maximal functionality and self care  Description: INTERVENTIONS  - Monitor swallowing and airway patency with patient fatigue and changes in neurological status  - Encourage and assist patient to increase activity and self care with guidance from PT/OT  - Encourage visually impaired, hearing impaired and aphasic patients to use assistive/communication devices  Outcome: Progressing     Problem: Impaired Functional Mobility  Goal: Achieve highest/safest level of mobility/gait  Description: Interventions:  - Assess patient's functional ability and stability  - Promote increasing activity/tolerance for mobility and gait  - Educate and engage patient/family in tolerated activity level and precautions  - Recommend use of total lift for transfers  - When transferring patient, block weaker knee for safety  Outcome: Progressing     Problem: SAFETY ADULT - FALL  Goal: Free from fall injury  Description: INTERVENTIONS:  - Assess pt frequently for physical needs  - Identify cognitive and physical deficits and behaviors that affect risk of falls.  - [place] fall precautions as indicated by  assessment.  - Educate pt/family on patient safety including physical limitations  - Instruct pt to call for assistance with activity based on assessment  - Modify environment to reduce risk of injury  - Provide assistive devices as appropriate  - Consider OT/PT consult to assist with strengthening/mobility  - Encourage toileting schedule  Outcome: Progressing     Problem: DISCHARGE PLANNING  Goal: Discharge to home or other facility with appropriate resources  Description: INTERVENTIONS:  - Identify barriers to discharge w/pt and caregiver  - Include patient/family/discharge partner in discharge planning  - Arrange for needed discharge resources and transportation as appropriate  - Identify discharge learning needs (meds, wound care, etc)  - Arrange for interpreters to assist at discharge as needed  - Consider post-discharge preferences of patient/family/discharge partner  - Complete POLST form as appropriate  - Assess patient's ability to be responsible for managing their own health  - Refer to Case Management Department for coordinating discharge planning if the patient needs post-hospital services based on physician/LIP order or complex needs related to functional status, cognitive ability or social support system  Outcome: Progressing

## 2024-08-31 NOTE — PLAN OF CARE
Patient is alert and oriented to self. Brother came to visit patient. Patient went down for MRI but was irritable and yelling at staff, MD notified. MRI will call when ready to take patient again, one time dose IV ativan ordered and to be given before MRI. K and Mg were replaced per electrolyte protocol. Patient refused to take scheduled po meds in the morning, RN attempted again in the afternoon but patient refused. Cardiology was notified, IV metoprolol was ordered. RN and PCT attempted to give patient breakfast and lunch but patient refused. RN and PCT attempted to move patient to chair for dinner but patient was uncooperative. Patient ate small bites of meal. Safety measures are in place.    Problem: Patient Centered Care  Goal: Patient preferences are identified and integrated in the patient's plan of care  Description: Interventions:  - What would you like us to know as we care for you? I love my puppy  - Provide timely, complete, and accurate information to patient/family  - Incorporate patient and family knowledge, values, beliefs, and cultural backgrounds into the planning and delivery of care  - Encourage patient/family to participate in care and decision-making at the level they choose  - Honor patient and family perspectives and choices  Outcome: Progressing     Problem: Patient/Family Goals  Goal: Patient/Family Long Term Goal  Description: Patient's Long Term Goal: discharge    Interventions:  - Monitor vital signs, labs, imaging  - PT/OT  - replace electrolytes per protocol  - activity as tolerated  - See additional Care Plan goals for specific interventions  Outcome: Progressing  Goal: Patient/Family Short Term Goal  Description: Patient's Short Term Goal: activity as tolerated    Interventions:   - PT/OT  - up to chair  - use of assistive devices  - See additional Care Plan goals for specific interventions  Outcome: Progressing     Problem: CARDIOVASCULAR - ADULT  Goal: Maintains optimal cardiac  output and hemodynamic stability  Description: INTERVENTIONS:  - Monitor vital signs, rhythm, and trends  - Monitor for bleeding, hypotension and signs of decreased cardiac output  - Evaluate effectiveness of vasoactive medications to optimize hemodynamic stability  - Monitor arterial and/or venous puncture sites for bleeding and/or hematoma  - Assess quality of pulses, skin color and temperature  - Assess for signs of decreased coronary artery perfusion - ex. Angina  - Evaluate fluid balance, assess for edema, trend weights  Outcome: Progressing  Goal: Absence of cardiac arrhythmias or at baseline  Description: INTERVENTIONS:  - Continuous cardiac monitoring, monitor vital signs, obtain 12 lead EKG if indicated  - Evaluate effectiveness of antiarrhythmic and heart rate control medications as ordered  - Initiate emergency measures for life threatening arrhythmias  - Monitor electrolytes and administer replacement therapy as ordered  Outcome: Progressing     Problem: NEUROLOGICAL - ADULT  Goal: Achieves stable or improved neurological status  Description: INTERVENTIONS  - Assess for and report changes in neurological status  - Initiate measures to prevent increased intracranial pressure  - Maintain blood pressure and fluid volume within ordered parameters to optimize cerebral perfusion and minimize risk of hemorrhage  - Monitor temperature, glucose, and sodium. Initiate appropriate interventions as ordered  Outcome: Progressing  Goal: Achieves maximal functionality and self care  Description: INTERVENTIONS  - Monitor swallowing and airway patency with patient fatigue and changes in neurological status  - Encourage and assist patient to increase activity and self care with guidance from PT/OT  - Encourage visually impaired, hearing impaired and aphasic patients to use assistive/communication devices  Outcome: Progressing     Problem: Impaired Functional Mobility  Goal: Achieve highest/safest level of  mobility/gait  Description: Interventions:  - Assess patient's functional ability and stability  - Promote increasing activity/tolerance for mobility and gait  - Educate and engage patient/family in tolerated activity level and precautions  - Recommend use of  RW for transfers and ambulation  Outcome: Progressing     Problem: SAFETY ADULT - FALL  Goal: Free from fall injury  Description: INTERVENTIONS:  - Assess pt frequently for physical needs  - Identify cognitive and physical deficits and behaviors that affect risk of falls.  - [place] fall precautions as indicated by assessment.  - Educate pt/family on patient safety including physical limitations  - Instruct pt to call for assistance with activity based on assessment  - Modify environment to reduce risk of injury  - Provide assistive devices as appropriate  - Consider OT/PT consult to assist with strengthening/mobility  - Encourage toileting schedule  Outcome: Progressing     Problem: DISCHARGE PLANNING  Goal: Discharge to home or other facility with appropriate resources  Description: INTERVENTIONS:  - Identify barriers to discharge w/pt and caregiver  - Include patient/family/discharge partner in discharge planning  - Arrange for needed discharge resources and transportation as appropriate  - Identify discharge learning needs (meds, wound care, etc)  - Arrange for interpreters to assist at discharge as needed  - Consider post-discharge preferences of patient/family/discharge partner  - Complete POLST form as appropriate  - Assess patient's ability to be responsible for managing their own health  - Refer to Case Management Department for coordinating discharge planning if the patient needs post-hospital services based on physician/LIP order or complex needs related to functional status, cognitive ability or social support system  Outcome: Progressing

## 2024-08-31 NOTE — PROGRESS NOTES
Progress Note     Karen Finney Patient Status:  Inpatient    1935 MRN [id number]   Location [facility] 3W/SW Attending Es Cabello MD   Hosp Day # 2 PCP LORENZO AYALA MD     Subjective:   S: Patient uncooperative during initial MRI attempt this morning.  Later seen at bedside, very sleepy. Staff reporting owning overnight, sleepy in day.       Review of Systems:   10 point ROS completed and was negative, except for pertinent positive and negatives stated in subjective.    Objective:   Vital signs:  Temp:  [97.8 °F (36.6 °C)-97.9 °F (36.6 °C)] 97.9 °F (36.6 °C)  Pulse:  [] 86  Resp:  [18-22] 18  BP: (133-166)/() 133/79  SpO2:  [97 %-99 %] 99 %    Wt Readings from Last 6 Encounters:   24 122 lb 3.2 oz (55.4 kg)   24 123 lb (55.8 kg)   10/26/22 140 lb (63.5 kg)   22 135 lb (61.2 kg)   22 140 lb (63.5 kg)   22 139 lb (63 kg)         Physical Exam:    General: No acute distress. Sleepy  Respiratory: Clear to auscultation bilaterally. No wheezes. No rhonchi.  Cardiovascular: S1, S2. Regular rate and rhythm. No murmurs, rubs or gallops.   Abdomen: Soft, nontender, nondistended.  Positive bowel sounds. No rebound or guarding.  Neurologic: No focal neurological deficits.   Musculoskeletal: Moves all extremities.  Extremities: No edema.    Results:   Diagnostic Data:      Labs:    Labs Last 24 Hours:   BMP     CBC    Other     Na 140 Cl 107 BUN 12 Glu 87   Hb 12.9   PTT - Procal -   K 2.9; 2.9 CO2 23.0 Cr 0.95   WBC 4.2 >< .0  INR - CRP -   Renal Lytes Endo    Hct 39.1   Trop - D dim -   eGFR - Ca 9.6 POC Gluc  -    LFT   pBNP - Lactic -   eGFR AA - PO4 - A1c -   AST - APk - Prot -  LDL -     Mg 1.5 TSH -   ALT - T sangita - Alb -        COVID-19 Lab Results    COVID-19  No results found for: \"COVID19\"    Pro-Calcitonin  No results for input(s): \"PCT\" in the last 168 hours.    Cardiac  No results for input(s): \"TROP\", \"PBNP\" in the last 168  hours.    Creatinine Kinase  Recent Labs   Lab 08/29/24  1216   *       Inflammatory Markers  No results for input(s): \"CRP\", \"CHRIS\", \"LDH\", \"DDIMER\" in the last 168 hours.    Imaging: Imaging data reviewed in Epic.    Medications:    rosuvastatin  10 mg Oral Nightly    metoprolol  5 mg Intravenous Q6H    thiamine  500 mg Intravenous TID    apixaban  2.5 mg Oral BID    losartan  50 mg Oral Daily    lidocaine-menthol  1 patch Transdermal Daily       Assessment & Plan:   ASSESSMENT / PLAN:     #Syncope/fall  - neurology consulted  - PT/OT  - MRI pending  - f/u ECHO  - fall precautions     #Acute encephalopathy.  -Neurology consulted, evaluated and think this may be progression of dementia  - MRI brain ordered    Atrial fibrillation.   - cardiology consulted  - ?compliance with AC- consider to stop at dc due to falls    # Essential hypertension- Cardiology following,     # Chronic kidney disease stage 3.            Dispo: [facility] once medically appropriate      Es Cabello MD    Supplementary Documentation:

## 2024-08-31 NOTE — PROGRESS NOTES
Progress Note  Karen Finney Patient Status:  Inpatient    1935 MRN [id number]   Location [facility] 3W/SW Attending Josse Fraire MD   Hosp Day # 2 PCP LORENZO AYALA MD     Subjective:  In bed on exam. Pleasantly confused.     Objective:  BP (!) 166/95 (BP Location: Left arm)   Pulse 91   Temp 97.8 °F (36.6 °C) (Axillary)   Resp 22   Wt 122 lb 3.2 oz (55.4 kg)   SpO2 99%   BMI 23.87 kg/m²     Telemetry: afib      Intake/Output:    Intake/Output Summary (Last 24 hours) at 2024 0616  Last data filed at 2024 0352  Gross per 24 hour   Intake 310 ml   Output 2150 ml   Net -1840 ml       Last 3 Weights   24 0500 122 lb 3.2 oz (55.4 kg)   24 0541 124 lb 12.8 oz (56.6 kg)   24 1752 122 lb 11.2 oz (55.7 kg)   24 1517 123 lb (55.8 kg)   10/26/22 1620 140 lb (63.5 kg)       Labs:  Recent Labs   Lab 24  1216 24  2257 24  0726   GLU 97  --  92   BUN 17  --  17   CREATSERUM 1.27*  --  1.08*   EGFRCR 41*  --  49*   CA 10.4  --  9.2     --  144   K 3.0* 3.2* 3.2*  3.2*     --  115*   CO2 25.0  --  22.0     Recent Labs   Lab 24  1216 24  0726   RBC 5.09 3.90   HGB 14.8 11.4*   HCT 44.4 34.5*   MCV 87.2 88.5   MCH 29.1 29.2   MCHC 33.3 33.0   RDW 14.8 15.2*   NEPRELIM 5.31 3.96   WBC 7.0 6.0   .0 232.0         Recent Labs   Lab 24  1216   *       Unable to complete ros    Physical Exam:    Gen: alert, NAD  Heent: pupils equal, reactive. Mucous membranes moist.   Neck: no jvd  Cardiac: irregularly irregular, normal S1,S2, 2/6 hsm  Lungs: CTA  Abd: soft, NT/ND +bs  Ext: no sig ble edema  Skin: Warm, dry  Neuro: No focal deficits. Baseline dementia.         Medications:     apixaban  2.5 mg Oral BID    losartan  50 mg Oral Daily    metoprolol tartrate  100 mg Oral 2x Daily(Beta Blocker)    lidocaine-menthol  1 patch Transdermal Daily      sodium chloride 100 mL/hr at 24 5153            Assessment:  Confusion, weakness  CT head with evidence of subcutaneous hematoma, no fracture  Difficulty caring for herself at home.   MRI brain pending  Permanent Afib, rates controlled  Bb, eliquis  May need to reassess long term AC given fall risk and age. Neuro note reviewed. Ok to continue for now.  Echo 8/30-LVEF 55-60%, severe MR  CAD s/p PCI 2012  Asa, statin  Normal nuc 2019  HTN-elevated  On arb, bb  HL-statin,   Advanced dementia   Severe MR  CKD-creat below recent baseline.     Plan:  Bp mildly elevated. Cont usual bb and arb. Would not be overaggressive with bp management given fall risk/age.   MRI brain planned.   Resume statin  Continues on eliquis. Long term, may need to reassess safety of oral AC  Labs pending    Plan of care discussed with patient, RN.    Vanessa Coyle, APRN  8/31/2024  6:16 AM    Addendum:  Notified by RN. Patient not cooperative/taking meds. Change PO BB to IV. Eliquis and arb unable to be given. Can use IVP hydralazine if needed.      =======================================================  Patient seen and examined independently.  Note reviewed and labs reviewed.  Agree with above assessment and plan.    Physical exam:  GEN: Somnolent.  CV: Irregular, irregular, S1S2, soft systolic murmur  LUNGS: CTA b/l with no obvious wheezing, rales or rhonchi  EXT: no b/l LE edema  ABD: soft, NTND    I have personally performed the medical decision making in its entirety. My additions include: None.    D/w REJI Coyle.    Manjeet Seymour MD

## 2024-08-31 NOTE — CM/SW NOTE
Called and spoke with brother, [facility] choice is BTE. SW reserved in [place].      PLAN: Staci rPice pending med clear.       Dorota Hutchison, MSW, LSW    [id number]

## 2024-08-31 NOTE — PROGRESS NOTES
[facility]  [address] [place] [address], [address]  [facility] [address]  [phone]        INPATIENT NEUROLOGY   FOLLOW UP PROGRESS NOTE  [facility]  part of [facility]    Karen Finney Patient Status:  Inpatient     1935 MRN [id number]    Location [facility] 3W/SW Attending Es Cabello MD    Hosp Day # 3 PCP LORENZO AYALA MD    Date of Admission:  2024  Date of Consult Follow Up:  2024     Assessment and Plan:   Karen Finney is a 89 year old right handed female w/ a pmhx sig. for   cognitive impairment, CAD status post stent, atrial fibrillation, hypertension, hyperlipidemia who was admitted to the hospital after being found down by her brother.  Had last been seen well several days ago-but brother at bedside states that he has overall seen a gradual decline in her functioning over the last year.  Realizing now that she may not have been taking her medicines correctly or at all in recent months.      Overall, seems like there is a more chronic cognitive impairment with progression noted over the past year which has made it difficult for her to manage on her own.  Exam currently is nonfocal making stroke less likely, but she has been off all of her medicines so cannot fully be excluded.  Patient was also complaining of headaches.  Currently she is sleeping and disoriented.  Advised her brother and the RN to try to keep the patient awake during the day to avoid inverting her sleep-wake cycle.  Will order delirium precautions.  Okay to continue apixaban.  Await MRI.    Likely undiagnosed late onset  Alzheimer's disease  Differential Diagnosis:  Low suspicion the patient has had an acute cerebrovascular event     Plan    Diagnostics:  MRI brain  Therapeutics:  Delirium precautions.  Neurochecks Q4      Procedures    CBC With Differential With Platelet    Comp Metabolic Panel (14)    CK (Creatine Kinase) (Not Creatinine)    TSH W  Reflex To Free T4    Urinalysis with Culture Reflex    Magnesium    Basic Metabolic Panel (8)    CBC With Differential With Platelet    Potassium    Potassium    Potassium    CBC With Differential With Platelet    Basic Metabolic Panel (8)    Potassium    Magnesium    Magnesium    XR CHEST AP PORTABLE  (CPT=71045)    CT BRAIN OR HEAD (CPT=70450)    XR PELVIS (1 VIEW) (CPT=72170)    MRI BRAIN (CPT=70551)           INTERVAL EVENTS  08/31/24:  could not tolerate first attempt at mri        SUBJECTIVE:       Functional activities questionnaire Score (0 to 3)   Writing checks and maintaining other financial records 3 = dependent; behind on her bills.  She was not paying her bills. Last month she was several months behind in her [employer] bill.    Assembling tax or business records 3 = dependent   Shopping alone 3 = dependent; brother shopping for her. She became tired quickly. Never goes by herself   Play a game of skill 1= Never did and would have difficulty now   Making coffee or tea 3 = dependent   Preparing a balanced meal 3 = dependent   Keeping track of current events 3 = dependent   Attending to or understanding a television program, book, or magazine 0 = independent   Remember appointment, family occasions, and medications 3 = dependent; her brother was picking up her prescriptions; 1 mo ago he discovered she was not taking her medications. She still had not started the new rx Dr. Gudino had prescribed. Did not want to  eliquis b/c cost was burdensome.    Traveling out of the neighborhood  3 = dependent   Sum score (0 to 30)  25   Scores >8= functional impairment    ADLs:  -Bathing/personal hygiene and grooming:Needs help  -Dressing: Independent  -Transferring: Independent  -Toileting/continence:  unaware  -Eating: Independent     Her brother mentioned she had unilateral headaches \"coming down the side of her face and affecting her vision.\"  Reviewed past medical history with the patient's brother.  Explained  plan thus far.  Reviewed imaging with her brother.  He also states that even after they had establish care with Dr. Serrato the patient was not taking her medications.  She will  need placement.  Patient's brother had just returned from visiting facilities.  Pertinent positive and negatives per HPI.  All others were reviewed and negative.       Objective   OBJECTIVE:   Last vitals and weight :  Blood pressure 150/84, pulse 84, temperature 97.9 °F (36.6 °C), temperature source Oral, resp. rate 18, weight 122 lb 3.2 oz (55.4 kg), SpO2 99%, not currently breastfeeding.   Vitals:    08/31/24 0632 08/31/24 0934 08/31/24 1229 08/31/24 1316   BP: (!) 163/98 (!) 158/102 (!) 162/106 150/84   BP Location: Left arm Left arm Left arm Left arm   Pulse: 90 105 102 84   Resp: 20 18 18    Temp: 97.9 °F (36.6 °C) 97.8 °F (36.6 °C) 97.9 °F (36.6 °C)    TempSrc: Axillary Axillary Oral    SpO2: 98% 97% 99%    Weight:          Exam:  - General: appears older than stated age and no distress     - Pulmonary: no sign of respiratory distress.   Neurologic Exam  - Mental Status: Requires repeated stimulation to maintain wakefulness and attend.   does regard.  She is disoriented.  She can tell me that the name of her stuffed animal \"is poppy.\"  She recognizes her brother.  She cannot tell me her last name.  She cannot tell me the location.  She does not know the date.  She does not know the year.  She can follow axial and appendicular commands but also requires pantomime.  She has difficulty constructing a full sentence.  Her speech is incoherent at times.  She has difficulty maintaining wakefulness.  She often dress back to sleep.  - Cranial Nerves: No gaze preference. Visual fields:normal  Pupils are 4mm briskly constricting to 3mm and equally round and reactive to light  in a well lit room. EOMI. No nystagmus. No ptosis. V1-V3 intact B/L to light touch.No pathological facial asymmetry. No flattening of the nasolabial fold. .  Hearing grossly  intact.  Tongue midline. No atrophy or fasiculations of the tongue noted. Palate and uvula elevate symmetrically.  Shoulder shrug symmetric.  - Motor:  normal tone, normal bulk. No interosseous wasting. No flattening of hypothenar eminences.    Right Left     Motor Strength    5 5   Knee extensors 5 5    Pronator drift: No pronator drift   Index finger rolling: No orbiting.   Finger Taps: Finger taps are symmetric in rate and amplitude. .    Rapid movements: Rapid/fine movements are symmetric. As expected their dominant hand is slightly faster.   Leg Drift: none    Asterixis: No asterixis noted.   Tremor:   - Sensory:   Light touch:normal   Temperature: normal  Vibration: normal   - Cerebellum: No truncal ataxia. No titubations. No dysmetria, no dysdiadochokinesis. No overshoot.  Reflexes:    C5 C6 C7  L4 S1   R 2+ 2+  2+ 2+   L 2+ 2+  2+ 2+   Adductor Spread: No adductor spread noted.      Daphne's sign:absent   Nonsustained clonus: Absent   Sustained clonus: Absent   - Plantar response: flexor bilaterally    Medications:   rosuvastatin  10 mg Oral Nightly    magnesium sulfate  4 g Intravenous Once    metoprolol  5 mg Intravenous Q6H    apixaban  2.5 mg Oral BID    losartan  50 mg Oral Daily    lidocaine-menthol  1 patch Transdermal Daily       PRNS:   LORazepam    ondansetron    hydrALAzine    OLANZapine (Zyprexa) 5 mg in sterile water for injection (PF) IM injection    acetaminophen    Infusions:    sodium chloride 100 mL/hr at 08/29/24 1807            Results:   Laboratory Data:  Lab Results   Component Value Date    WBC 4.2 08/31/2024    HGB 12.9 08/31/2024    HCT 39.1 08/31/2024    .0 08/31/2024    CREATSERUM 1.03 (H) 09/01/2024    BUN 16 09/01/2024     09/01/2024    K 3.3 (L) 09/01/2024     09/01/2024    CO2 20.0 (L) 09/01/2024    GLU 75 09/01/2024    CA 9.0 09/01/2024    ALB 3.9 08/29/2024    ALKPHO 67 08/29/2024    TP 6.9 08/29/2024    AST 42 (H) 08/29/2024    ALT 11 08/29/2024     TSH 2.438 08/29/2024    GGT 44 11/07/2017    MG 2.3 09/01/2024     (H) 08/29/2024    B12 1,294 (H) 08/19/2024     Recent Results (from the past 72 hour(s))   EKG 12 Lead    Collection Time: 08/29/24 12:13 PM   Result Value Ref Range    Ventricular rate 106 BPM    Atrial rate  BPM    P-R Interval  ms    QRS Duration 90 ms    Q-T Interval 326 ms    QTC Calculation (Bezet) 433 ms    P Axis  degrees    R Axis 49 degrees    T Axis -38 degrees   POCT Glucose    Collection Time: 08/29/24 12:14 PM   Result Value Ref Range    POC Glucose  90 70 - 99 mg/dL   RAINBOW DRAW LAVENDER    Collection Time: 08/29/24 12:16 PM   Result Value Ref Range    Hold Lavender Auto Resulted    RAINBOW DRAW LIGHT GREEN    Collection Time: 08/29/24 12:16 PM   Result Value Ref Range    Hold Lt Green Auto Resulted    RAINBOW DRAW BLUE    Collection Time: 08/29/24 12:16 PM   Result Value Ref Range    Hold Blue Auto Resulted    RAINBOW DRAW GOLD    Collection Time: 08/29/24 12:16 PM   Result Value Ref Range    Hold Gold Auto Resulted    CBC With Differential With Platelet    Collection Time: 08/29/24 12:16 PM   Result Value Ref Range    WBC 7.0 4.0 - 11.0 x10(3) uL    RBC 5.09 3.80 - 5.30 x10(6)uL    HGB 14.8 12.0 - 16.0 g/dL    HCT 44.4 35.0 - 48.0 %    MCV 87.2 80.0 - 100.0 fL    MCH 29.1 26.0 - 34.0 pg    MCHC 33.3 31.0 - 37.0 g/dL    RDW-SD 47.4 (H) 35.1 - 46.3 fL    RDW 14.8 11.0 - 15.0 %    .0 150.0 - 450.0 10(3)uL    Neutrophil Absolute Prelim 5.31 1.50 - 7.70 x10 (3) uL    Neutrophil Absolute 5.31 1.50 - 7.70 x10(3) uL    Lymphocyte Absolute 1.08 1.00 - 4.00 x10(3) uL    Monocyte Absolute 0.48 0.10 - 1.00 x10(3) uL    Eosinophil Absolute 0.03 0.00 - 0.70 x10(3) uL    Basophil Absolute 0.04 0.00 - 0.20 x10(3) uL    Immature Granulocyte Absolute 0.01 0.00 - 1.00 x10(3) uL    Neutrophil % 76.5 %    Lymphocyte % 15.5 %    Monocyte % 6.9 %    Eosinophil % 0.4 %    Basophil % 0.6 %    Immature Granulocyte % 0.1 %   Comp Metabolic Panel  (14)    Collection Time: 08/29/24 12:16 PM   Result Value Ref Range    Glucose 97 70 - 99 mg/dL    Sodium 142 136 - 145 mmol/L    Potassium 3.0 (L) 3.5 - 5.1 mmol/L    Chloride 105 98 - 112 mmol/L    CO2 25.0 21.0 - 32.0 mmol/L    Anion Gap 12 0 - 18 mmol/L    BUN 17 9 - 23 mg/dL    Creatinine 1.27 (H) 0.55 - 1.02 mg/dL    BUN/CREA Ratio 13.4 10.0 - 20.0    Calcium, Total 10.4 8.7 - 10.4 mg/dL    Calculated Osmolality 295 275 - 295 mOsm/kg    eGFR-Cr 41 (L) >=60 mL/min/1.73m2    ALT 11 10 - 49 U/L    AST 42 (H) <34 U/L    Alkaline Phosphatase 67 55 - 142 U/L    Bilirubin, Total 1.8 (H) 0.2 - 1.1 mg/dL    Total Protein 6.9 5.7 - 8.2 g/dL    Albumin 3.9 3.2 - 4.8 g/dL    Globulin  3.0 2.0 - 3.5 g/dL    A/G Ratio 1.3 1.0 - 2.0   CK (Creatine Kinase) (Not Creatinine)    Collection Time: 08/29/24 12:16 PM   Result Value Ref Range     (H)  U/L U/L   TSH W Reflex To Free T4    Collection Time: 08/29/24 12:16 PM   Result Value Ref Range    TSH 2.438 0.550 - 4.780 mIU/mL   Magnesium    Collection Time: 08/29/24 12:16 PM   Result Value Ref Range    Magnesium 1.9 1.6 - 2.6 mg/dL   Urinalysis with Culture Reflex    Collection Time: 08/29/24  1:09 PM    Specimen: Urine, clean catch   Result Value Ref Range    Urine Color Light-Yellow Yellow    Clarity Urine Clear Clear    Spec Gravity 1.014 1.005 - 1.030    Glucose Urine Normal Normal mg/dL    Bilirubin Urine Negative Negative    Ketones Urine 10 (A) Negative mg/dL    Blood Urine Trace (A) Negative    pH Urine 6.5 5.0 - 8.0    Protein Urine 50 (A) Negative mg/dL    Urobilinogen Urine Normal Normal    Nitrite Urine Negative Negative    Leukocyte Esterase Urine Negative Negative    WBC Urine 1-5 0 - 5 /HPF    RBC Urine 0-2 0 - 2 /HPF    Bacteria Urine None Seen None Seen /HPF    Squamous Epi. Cells Few (A) None Seen /HPF    Renal Tubular Epithelial Cells None Seen None Seen /HPF    Transitional Cells None Seen None Seen /HPF    Yeast Urine None Seen None Seen /HPF    Potassium    Collection Time: 08/29/24 10:57 PM   Result Value Ref Range    Potassium 3.2 (L) 3.5 - 5.1 mmol/L   Basic Metabolic Panel (8)    Collection Time: 08/30/24  7:26 AM   Result Value Ref Range    Glucose 92 70 - 99 mg/dL    Sodium 144 136 - 145 mmol/L    Potassium 3.2 (L) 3.5 - 5.1 mmol/L    Chloride 115 (H) 98 - 112 mmol/L    CO2 22.0 21.0 - 32.0 mmol/L    Anion Gap 7 0 - 18 mmol/L    BUN 17 9 - 23 mg/dL    Creatinine 1.08 (H) 0.55 - 1.02 mg/dL    BUN/CREA Ratio 15.7 10.0 - 20.0    Calcium, Total 9.2 8.7 - 10.4 mg/dL    Calculated Osmolality 299 (H) 275 - 295 mOsm/kg    eGFR-Cr 49 (L) >=60 mL/min/1.73m2   CBC With Differential With Platelet    Collection Time: 08/30/24  7:26 AM   Result Value Ref Range    WBC 6.0 4.0 - 11.0 x10(3) uL    RBC 3.90 3.80 - 5.30 x10(6)uL    HGB 11.4 (L) 12.0 - 16.0 g/dL    HCT 34.5 (L) 35.0 - 48.0 %    MCV 88.5 80.0 - 100.0 fL    MCH 29.2 26.0 - 34.0 pg    MCHC 33.0 31.0 - 37.0 g/dL    RDW-SD 48.9 (H) 35.1 - 46.3 fL    RDW 15.2 (H) 11.0 - 15.0 %    .0 150.0 - 450.0 10(3)uL    Neutrophil Absolute Prelim 3.96 1.50 - 7.70 x10 (3) uL    Neutrophil Absolute 3.96 1.50 - 7.70 x10(3) uL    Lymphocyte Absolute 1.31 1.00 - 4.00 x10(3) uL    Monocyte Absolute 0.49 0.10 - 1.00 x10(3) uL    Eosinophil Absolute 0.14 0.00 - 0.70 x10(3) uL    Basophil Absolute 0.04 0.00 - 0.20 x10(3) uL    Immature Granulocyte Absolute 0.02 0.00 - 1.00 x10(3) uL    Neutrophil % 66.5 %    Lymphocyte % 22.0 %    Monocyte % 8.2 %    Eosinophil % 2.3 %    Basophil % 0.7 %    Immature Granulocyte % 0.3 %   Potassium    Collection Time: 08/30/24  7:26 AM   Result Value Ref Range    Potassium 3.2 (L) 3.5 - 5.1 mmol/L   Potassium    Collection Time: 08/31/24  7:21 AM   Result Value Ref Range    Potassium 2.9 (LL) 3.5 - 5.1 mmol/L   CBC With Differential With Platelet    Collection Time: 08/31/24  7:21 AM   Result Value Ref Range    WBC 4.2 4.0 - 11.0 x10(3) uL    RBC 4.43 3.80 - 5.30 x10(6)uL    HGB 12.9 12.0  - 16.0 g/dL    HCT 39.1 35.0 - 48.0 %    MCV 88.3 80.0 - 100.0 fL    MCH 29.1 26.0 - 34.0 pg    MCHC 33.0 31.0 - 37.0 g/dL    RDW-SD 48.2 (H) 35.1 - 46.3 fL    RDW 15.1 (H) 11.0 - 15.0 %    .0 150.0 - 450.0 10(3)uL    Neutrophil Absolute Prelim 2.89 1.50 - 7.70 x10 (3) uL    Neutrophil Absolute 2.89 1.50 - 7.70 x10(3) uL    Lymphocyte Absolute 0.70 (L) 1.00 - 4.00 x10(3) uL    Monocyte Absolute 0.43 0.10 - 1.00 x10(3) uL    Eosinophil Absolute 0.07 0.00 - 0.70 x10(3) uL    Basophil Absolute 0.05 0.00 - 0.20 x10(3) uL    Immature Granulocyte Absolute 0.02 0.00 - 1.00 x10(3) uL    Neutrophil % 69.5 %    Lymphocyte % 16.8 %    Monocyte % 10.3 %    Eosinophil % 1.7 %    Basophil % 1.2 %    Immature Granulocyte % 0.5 %   Basic Metabolic Panel (8)    Collection Time: 08/31/24  7:21 AM   Result Value Ref Range    Glucose 87 70 - 99 mg/dL    Sodium 140 136 - 145 mmol/L    Potassium 2.9 (LL) 3.5 - 5.1 mmol/L    Chloride 107 98 - 112 mmol/L    CO2 23.0 21.0 - 32.0 mmol/L    Anion Gap 10 0 - 18 mmol/L    BUN 12 9 - 23 mg/dL    Creatinine 0.95 0.55 - 1.02 mg/dL    BUN/CREA Ratio 12.6 10.0 - 20.0    Calcium, Total 9.6 8.7 - 10.4 mg/dL    Calculated Osmolality 289 275 - 295 mOsm/kg    eGFR-Cr 58 (L) >=60 mL/min/1.73m2   Magnesium    Collection Time: 08/31/24  7:21 AM   Result Value Ref Range    Magnesium 1.5 (L) 1.6 - 2.6 mg/dL         Test results/Imaging:   CT BRAIN OR HEAD (CPT=70450)    Result Date: 8/29/2024  CONCLUSION:  1. No acute intracranial hemorrhage.  No acute intracranial CT abnormalities 2. Cerebral cortical atrophy, prominently frontal temporal regions. 3. Mild cerebellar atrophy age appropriate. 4. Asymmetric soft tissue subcutaneous swelling/calvarial hematoma right temporal parietal and left posterior parietal region has developed suggesting subacute swelling/hematoma 5. Chronic appearing left mastoid effusions.     Dictated by (CST): Houston Bradley MD on 8/29/2024 at 2:35 PM     Finalized by (CST):  Houston Bradley MD on 8/29/2024 at 2:46 PM                Performed an independent visualization of:  CT brain WO   Imaging revealed: Agree with radiology read.    Education/Instructions given to: patient   Barriers to Learning:None  Content: Refer to note above. Evaluation/Outcome: Verbalized understanding    Disclaimer:   This record was dictated using Dragon software. There may be errors due to voice recognition problems that were not realized and corrected during the completion of the note.      This document is not intended to support charting by exception.  Sections left blank in a completed note should be presumed not to have been done.     Total time spent in the care of the patient including reviewing the prior records, reviewing the prior imaging, discussing the case with the RN discussing case with patient's brother on the hospital floor and face to face time was 35 minutes, more than 50% of the time was spent in counseling and/or coordination of care related to dementia.    Thank you.  Gerard Herrera D.O.   Vascular & General Neurology    8/31/2024  4:14 PM

## 2024-08-31 NOTE — PLAN OF CARE
Patient is alert and oriented to self. IVF are in place. Brother came to visit patient and helped verify med rec. PT/OT got patient up to the chair. Patient tolerated stand pivot with 2 person assist back to bed. Echo was done. Safety measures are in place.    Problem: Patient Centered Care  Goal: Patient preferences are identified and integrated in the patient's plan of care  Description: Interventions:  - What would you like us to know as we care for you? I'm from home alone  - Provide timely, complete, and accurate information to patient/family  - Incorporate patient and family knowledge, values, beliefs, and cultural backgrounds into the planning and delivery of care  - Encourage patient/family to participate in care and decision-making at the level they choose  - Honor patient and family perspectives and choices  Outcome: Progressing     Problem: Patient/Family Goals  Goal: Patient/Family Long Term Goal  Description: Patient's Long Term Goal: discharge    Interventions:  - Monitor vital signs, labs, imaging  - See additional Care Plan goals for specific interventions  Outcome: Progressing  Goal: Patient/Family Short Term Goal  Description: Patient's Short Term Goal: activity as tolerated    Interventions:   - PT/OT  - up to chair  - use of assistive devices  - See additional Care Plan goals for specific interventions  Outcome: Progressing     Problem: CARDIOVASCULAR - ADULT  Goal: Maintains optimal cardiac output and hemodynamic stability  Description: INTERVENTIONS:  - Monitor vital signs, rhythm, and trends  - Monitor for bleeding, hypotension and signs of decreased cardiac output  - Evaluate effectiveness of vasoactive medications to optimize hemodynamic stability  - Monitor arterial and/or venous puncture sites for bleeding and/or hematoma  - Assess quality of pulses, skin color and temperature  - Assess for signs of decreased coronary artery perfusion - ex. Angina  - Evaluate fluid balance, assess for  edema, trend weights  Outcome: Progressing  Goal: Absence of cardiac arrhythmias or at baseline  Description: INTERVENTIONS:  - Continuous cardiac monitoring, monitor vital signs, obtain 12 lead EKG if indicated  - Evaluate effectiveness of antiarrhythmic and heart rate control medications as ordered  - Initiate emergency measures for life threatening arrhythmias  - Monitor electrolytes and administer replacement therapy as ordered  Outcome: Progressing     Problem: NEUROLOGICAL - ADULT  Goal: Achieves stable or improved neurological status  Description: INTERVENTIONS  - Assess for and report changes in neurological status  - Initiate measures to prevent increased intracranial pressure  - Maintain blood pressure and fluid volume within ordered parameters to optimize cerebral perfusion and minimize risk of hemorrhage  - Monitor temperature, glucose, and sodium. Initiate appropriate interventions as ordered  Outcome: Progressing  Goal: Achieves maximal functionality and self care  Description: INTERVENTIONS  - Monitor swallowing and airway patency with patient fatigue and changes in neurological status  - Encourage and assist patient to increase activity and self care with guidance from PT/OT  - Encourage visually impaired, hearing impaired and aphasic patients to use assistive/communication devices  Outcome: Progressing     Problem: Impaired Functional Mobility  Goal: Achieve highest/safest level of mobility/gait  Description: Interventions:  - Assess patient's functional ability and stability  - Promote increasing activity/tolerance for mobility and gait  - Educate and engage patient/family in tolerated activity level and precautions  - Recommend use of  RW for transfers and ambulation  Outcome: Progressing     Problem: SAFETY ADULT - FALL  Goal: Free from fall injury  Description: INTERVENTIONS:  - Assess pt frequently for physical needs  - Identify cognitive and physical deficits and behaviors that affect risk of  falls.  - [place] fall precautions as indicated by assessment.  - Educate pt/family on patient safety including physical limitations  - Instruct pt to call for assistance with activity based on assessment  - Modify environment to reduce risk of injury  - Provide assistive devices as appropriate  - Consider OT/PT consult to assist with strengthening/mobility  - Encourage toileting schedule  Outcome: Progressing     Problem: DISCHARGE PLANNING  Goal: Discharge to home or other facility with appropriate resources  Description: INTERVENTIONS:  - Identify barriers to discharge w/pt and caregiver  - Include patient/family/discharge partner in discharge planning  - Arrange for needed discharge resources and transportation as appropriate  - Identify discharge learning needs (meds, wound care, etc)  - Arrange for interpreters to assist at discharge as needed  - Consider post-discharge preferences of patient/family/discharge partner  - Complete POLST form as appropriate  - Assess patient's ability to be responsible for managing their own health  - Refer to Case Management Department for coordinating discharge planning if the patient needs post-hospital services based on physician/LIP order or complex needs related to functional status, cognitive ability or social support system  Outcome: Progressing

## 2024-09-01 PROBLEM — F03.90 DEMENTIA (HCC): Status: ACTIVE | Noted: 2024-09-01

## 2024-09-01 LAB
ANION GAP SERPL CALC-SCNC: 10 MMOL/L (ref 0–18)
BUN BLD-MCNC: 16 MG/DL (ref 9–23)
BUN/CREAT SERPL: 15.5 (ref 10–20)
CALCIUM BLD-MCNC: 9 MG/DL (ref 8.7–10.4)
CHLORIDE SERPL-SCNC: 111 MMOL/L (ref 98–112)
CK SERPL-CCNC: 136 U/L
CO2 SERPL-SCNC: 20 MMOL/L (ref 21–32)
CREAT BLD-MCNC: 1.03 MG/DL
EGFRCR SERPLBLD CKD-EPI 2021: 52 ML/MIN/1.73M2 (ref 60–?)
GLUCOSE BLD-MCNC: 75 MG/DL (ref 70–99)
MAGNESIUM SERPL-MCNC: 2.3 MG/DL (ref 1.6–2.6)
OSMOLALITY SERPL CALC.SUM OF ELEC: 292 MOSM/KG (ref 275–295)
POTASSIUM SERPL-SCNC: 3.3 MMOL/L (ref 3.5–5.1)
SODIUM SERPL-SCNC: 141 MMOL/L (ref 136–145)

## 2024-09-01 PROCEDURE — 99232 SBSQ HOSP IP/OBS MODERATE 35: CPT | Performed by: OTHER

## 2024-09-01 PROCEDURE — 99233 SBSQ HOSP IP/OBS HIGH 50: CPT | Performed by: STUDENT IN AN ORGANIZED HEALTH CARE EDUCATION/TRAINING PROGRAM

## 2024-09-01 RX ORDER — TIMOLOL MALEATE 5 MG/ML
1 SOLUTION/ DROPS OPHTHALMIC 2 TIMES DAILY
Status: DISCONTINUED | OUTPATIENT
Start: 2024-09-01 | End: 2024-09-02

## 2024-09-01 RX ORDER — LORAZEPAM 2 MG/ML
1 INJECTION INTRAMUSCULAR ONCE
Status: COMPLETED | OUTPATIENT
Start: 2024-09-01 | End: 2024-09-02

## 2024-09-01 RX ORDER — ROSUVASTATIN CALCIUM 10 MG/1
10 TABLET, COATED ORAL NIGHTLY
Status: DISCONTINUED | OUTPATIENT
Start: 2024-09-01 | End: 2024-09-02

## 2024-09-01 RX ORDER — DONEPEZIL HYDROCHLORIDE 5 MG/1
5 TABLET, FILM COATED ORAL EVERY MORNING
Status: DISCONTINUED | OUTPATIENT
Start: 2024-09-01 | End: 2024-09-02

## 2024-09-01 RX ORDER — PRASTERONE (DHEA) 50 MG
450 CAPSULE ORAL DAILY
Status: DISCONTINUED | OUTPATIENT
Start: 2024-09-01 | End: 2024-09-01 | Stop reason: RX

## 2024-09-01 RX ORDER — LATANOPROST 50 UG/ML
1 SOLUTION/ DROPS OPHTHALMIC NIGHTLY
Status: DISCONTINUED | OUTPATIENT
Start: 2024-09-01 | End: 2024-09-02

## 2024-09-01 RX ORDER — ERGOCALCIFEROL 1.25 MG/1
50000 CAPSULE, LIQUID FILLED ORAL WEEKLY
Status: DISCONTINUED | OUTPATIENT
Start: 2024-09-01 | End: 2024-09-02

## 2024-09-01 NOTE — PLAN OF CARE
Patient denies pain and discomfort, mostly sleepy. RA, IVF @100, refuses oral meds. Patient bladder scanned 506, straight cath done 600 out. Safety measures in place call light within reach.Plan MRI   Problem: Patient Centered Care  Goal: Patient preferences are identified and integrated in the patient's plan of care  Description: Interventions:  - What would you like us to know as we care for you? I love my puppy  - Provide timely, complete, and accurate information to patient/family  - Incorporate patient and family knowledge, values, beliefs, and cultural backgrounds into the planning and delivery of care  - Encourage patient/family to participate in care and decision-making at the level they choose  - Honor patient and family perspectives and choices  Outcome: Progressing     Problem: Patient/Family Goals  Goal: Patient/Family Long Term Goal  Description: Patient's Long Term Goal: discharge    Interventions:  - Monitor vital signs, labs, imaging  - PT/OT  - replace electrolytes per protocol  - activity as tolerated  - See additional Care Plan goals for specific interventions  Outcome: Progressing  Goal: Patient/Family Short Term Goal  Description: Patient's Short Term Goal: activity as tolerated    Interventions:   - PT/OT  - up to chair  - use of assistive devices  - See additional Care Plan goals for specific interventions  Outcome: Progressing     Problem: CARDIOVASCULAR - ADULT  Goal: Maintains optimal cardiac output and hemodynamic stability  Description: INTERVENTIONS:  - Monitor vital signs, rhythm, and trends  - Monitor for bleeding, hypotension and signs of decreased cardiac output  - Evaluate effectiveness of vasoactive medications to optimize hemodynamic stability  - Monitor arterial and/or venous puncture sites for bleeding and/or hematoma  - Assess quality of pulses, skin color and temperature  - Assess for signs of decreased coronary artery perfusion - ex. Angina  - Evaluate fluid balance, assess  for edema, trend weights  Outcome: Progressing  Goal: Absence of cardiac arrhythmias or at baseline  Description: INTERVENTIONS:  - Continuous cardiac monitoring, monitor vital signs, obtain 12 lead EKG if indicated  - Evaluate effectiveness of antiarrhythmic and heart rate control medications as ordered  - Initiate emergency measures for life threatening arrhythmias  - Monitor electrolytes and administer replacement therapy as ordered  Outcome: Progressing     Problem: NEUROLOGICAL - ADULT  Goal: Achieves stable or improved neurological status  Description: INTERVENTIONS  - Assess for and report changes in neurological status  - Initiate measures to prevent increased intracranial pressure  - Maintain blood pressure and fluid volume within ordered parameters to optimize cerebral perfusion and minimize risk of hemorrhage  - Monitor temperature, glucose, and sodium. Initiate appropriate interventions as ordered  Outcome: Progressing  Goal: Achieves maximal functionality and self care  Description: INTERVENTIONS  - Monitor swallowing and airway patency with patient fatigue and changes in neurological status  - Encourage and assist patient to increase activity and self care with guidance from PT/OT  - Encourage visually impaired, hearing impaired and aphasic patients to use assistive/communication devices  Outcome: Progressing     Problem: Impaired Functional Mobility  Goal: Achieve highest/safest level of mobility/gait  Description: Interventions:  - Assess patient's functional ability and stability  - Promote increasing activity/tolerance for mobility and gait  - Educate and engage patient/family in tolerated activity level and precautions    Outcome: Progressing     Problem: SAFETY ADULT - FALL  Goal: Free from fall injury  Description: INTERVENTIONS:  - Assess pt frequently for physical needs  - Identify cognitive and physical deficits and behaviors that affect risk of falls.  - [place] fall precautions as  indicated by assessment.  - Educate pt/family on patient safety including physical limitations  - Instruct pt to call for assistance with activity based on assessment  - Modify environment to reduce risk of injury  - Provide assistive devices as appropriate  - Consider OT/PT consult to assist with strengthening/mobility  - Encourage toileting schedule  Outcome: Progressing     Problem: DISCHARGE PLANNING  Goal: Discharge to home or other facility with appropriate resources  Description: INTERVENTIONS:  - Identify barriers to discharge w/pt and caregiver  - Include patient/family/discharge partner in discharge planning  - Arrange for needed discharge resources and transportation as appropriate  - Identify discharge learning needs (meds, wound care, etc)  - Arrange for interpreters to assist at discharge as needed  - Consider post-discharge preferences of patient/family/discharge partner  - Complete POLST form as appropriate  - Assess patient's ability to be responsible for managing their own health  - Refer to Case Management Department for coordinating discharge planning if the patient needs post-hospital services based on physician/LIP order or complex needs related to functional status, cognitive ability or social support system  Outcome: Progressing

## 2024-09-01 NOTE — PROGRESS NOTES
Progress Note     Karen Finney Patient Status:  Inpatient    1935 MRN [id number]   Location [facility] 3W/SW Attending Es Cabello MD   Hosp Day # 3 PCP LORENZO AYALA MD     Subjective:   S: Patient much more awake, sitting up in bed, had just eaten breakfast. Speaking with stuffed puppy as if it was real animal.  Oral meds restarted.   Patient needed straight cath due to retention, purewick placed.  Brother at bedside stated over the past many years she has had cognitive decline, and even apparently diagnosed with dementia.  Code status attempted to be discussed but patient started asking tangential questions, could not hold a conversation.  Palliative consulted for goals of care, advanced care planning.   CM contacted.  Patient accepted to [facility]. Resources/lists given to brother to look into skilled nursing.     Still pending MRI today.         Review of Systems:   10 point ROS completed and was negative, except for pertinent positive and negatives stated in subjective.    Objective:   Vital signs:  Temp:  [97.4 °F (36.3 °C)-98 °F (36.7 °C)] 98 °F (36.7 °C)  Pulse:  [] 103  Resp:  [18] 18  BP: (116-155)/(69-95) 135/84  SpO2:  [98 %-100 %] 98 %    Wt Readings from Last 6 Encounters:   24 122 lb 3.2 oz (55.4 kg)   24 123 lb (55.8 kg)   10/26/22 140 lb (63.5 kg)   22 135 lb (61.2 kg)   22 140 lb (63.5 kg)   22 139 lb (63 kg)         Physical Exam:      General: Elderly female, confused, disheveled. No acute distress. AxO1-2  Respiratory: Clear to auscultation bilaterally. No wheezes. No rhonchi.  Cardiovascular: S1, S2. Regular rate and rhythm. No murmurs, rubs or gallops.   Abdomen: Soft, nontender, nondistended.  Positive bowel sounds. No rebound or guarding.  Neurologic: No focal neurological deficits.   Musculoskeletal: Moves all extremities.  Extremities: No edema.  Psych: speaking with stuffed animal, feeding it food       Results:   Diagnostic Data:       Labs:    Labs Last 24 Hours:   BMP     CBC    Other     Na 141 Cl 111 BUN 16 Glu 75   Hb -   PTT - Procal -   K 3.3 CO2 20.0 Cr 1.03   WBC - >< PLT -  INR - CRP -   Renal Lytes Endo    Hct -   Trop - D dim -   eGFR - Ca 9.0 POC Gluc  -    LFT   pBNP - Lactic -   eGFR AA - PO4 - A1c -   AST - APk - Prot -  LDL -     Mg 2.3 TSH -   ALT - T sangita - Alb -        COVID-19 Lab Results    COVID-19  No results found for: \"COVID19\"    Pro-Calcitonin  No results for input(s): \"PCT\" in the last 168 hours.    Cardiac  No results for input(s): \"TROP\", \"PBNP\" in the last 168 hours.    Creatinine Kinase  Recent Labs   Lab 08/29/24  1216 09/01/24  0807   * 136       Inflammatory Markers  No results for input(s): \"CRP\", \"CHRIS\", \"LDH\", \"DDIMER\" in the last 168 hours.    Imaging: Imaging data reviewed in Epic.    Medications:    LORazepam  1 mg Intravenous Once    timolol  1 drop Both Eyes BID    ergocalciferol  50,000 Units Oral Weekly    latanoprost  1 drop Both Eyes Nightly    rosuvastatin  10 mg Oral Nightly    donepezil  5 mg Oral QAM    metoprolol  5 mg Intravenous Q6H    thiamine  500 mg Intravenous TID    apixaban  2.5 mg Oral BID    losartan  50 mg Oral Daily    lidocaine-menthol  1 patch Transdermal Daily       Assessment & Plan:   ASSESSMENT / PLAN:     #Syncope/fall  - neurology consulted  - PT/OT recommend acute rehab   - MRI pending  - ECHO with severe MR    - fall precautions     #Acute on chronic encephalopathy  #Progression of Dementia   -Neurology consulted, evaluated and think this may be progression of dementia  - MRI brain pending    Atrial fibrillation.   - cardiology consulted  - ?compliance with AC- consider to stop at dc due to falls    # Essential hypertension- Cardiology following,     # Chronic kidney disease stage 3.     #Urinary retention- monitor I/Os     #hypokalemia- replacement ordered       Attempted discussion with patient, and had long discussion with brother regarding goals of care, code  status. There is no POA listed.   Palliative consulted for further discussion.        Dispo: accepted to [facility], pending MRI and medical optimization. Anticipate dc soon.        Es Cabello MD    Supplementary Documentation:

## 2024-09-01 NOTE — PROGRESS NOTES
[facility]  [address] [place] [address], [address]  [facility] [address]  [phone]        INPATIENT NEUROLOGY   FOLLOW UP PROGRESS NOTE  [facility]  part of [facility]    Karen Finney Patient Status:  Inpatient     1935 MRN [id number]    Location [facility] 3W/SW Attending Es Cabello MD    Hosp Day # 3 PCP LORENZO AYALA MD    Date of Admission:  2024  Date of Consult Follow Up: 24       Assessment and Plan:   Karen Finney is a 89 year old right handed female w/ a pmhx sig. for   cognitive impairment, CAD status post stent, atrial fibrillation, hypertension, hyperlipidemia who was admitted to the hospital after being found down by her brother.  Had last been seen well several days ago-but brother at bedside states that he has overall seen a gradual decline in her functioning over the last year.  Realizing now that she may not have been taking her medicines correctly or at all in recent months.      Overall, seems like there is a more chronic cognitive impairment with progression noted over the past year which has made it difficult for her to manage on her own.  Exam currently is nonfocal making stroke less likely, but she has been off all of her medicines so cannot fully be excluded.  Patient was also complaining of headaches.  Currently she is sleeping and disoriented.  Advised her brother and the RN to try to keep the patient awake during the day to avoid inverting her sleep-wake cycle.  Will order delirium precautions.  Okay to continue apixaban.  Await MRI. Stared on aricept on 24.  Exam is significantly improved on 2024.  If the patient cannot tolerate the MRI and she is stable for discharge then recommend repeat head CT to evaluate for any evolving hypodensity.    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  Neurochecks Q4  Start aricept      Procedures    CBC With Differential With Platelet    Comp Metabolic Panel (14)    CK (Creatine Kinase) (Not Creatinine)    TSH W Reflex To Free T4    Urinalysis with Culture Reflex    Magnesium    Basic Metabolic Panel (8)    CBC With Differential With Platelet    Potassium    Potassium    Potassium    CBC With Differential With Platelet    Basic Metabolic Panel (8)    Potassium    Magnesium    Magnesium    XR CHEST AP PORTABLE  (CPT=71045)    CT BRAIN OR HEAD (CPT=70450)    XR PELVIS (1 VIEW) (CPT=72170)    MRI BRAIN (CPT=70551)           INTERVAL EVENTS  08/31/24:  could not tolerate first attempt at mri   09/01/24: more awake         SUBJECTIVE:   More awake.  Still disoriented.  States she is willing to take the MRI.  Denies being in any pain.  No new weakness or numbness.  No new neurological deficits.  She does not know exactly where she is.  Explained to her that she was in the hospital after she was found down by her brother and will need to go to a care facility.  Denies any complaints or concerns.  Brother agrees that she is more awake and interactive.       Pertinent positive and negatives per HPI.  All others were reviewed and negative.       Objective   OBJECTIVE:   Last vitals and weight :  Blood pressure 135/84, pulse 103, temperature 98 °F (36.7 °C), temperature source Axillary, resp. rate 18, weight 122 lb 3.2 oz (55.4 kg), SpO2 98%, not currently breastfeeding.   Vitals:    09/01/24 0107 09/01/24 0634 09/01/24 0909 09/01/24 1353   BP: 116/78 (!) 155/94 (!) 148/95 135/84   BP Location: Left arm Left arm Right arm Right arm   Pulse: 75 92 82 103   Resp:  18 18 18   Temp:  97.4 °F (36.3 °C) 97.6 °F (36.4 °C) 98 °F (36.7 °C)   TempSrc:  Axillary Axillary Axillary   SpO2:  99% 100% 98%   Weight:  122 lb 3.2 oz (55.4 kg)        Exam:  - General: appears older than stated age and no distress     -  Pulmonary: no sign of respiratory distress.   Neurologic Exam  - Mental Status: Alert and attentive.  Regards.  She is oriented to self.  She does not know where she is.  Does not know the purpose of the visit.  Disoriented to the date.   recent memory is impaired.  She is able to follow axial and appendicular commands.  She can construct a sentence but has some phonemic and paraphasic errors.    - Cranial Nerves: No gaze preference. Visual fields:normal  Pupils are 4mm briskly constricting to 3mm and equally round and reactive to light  in a well lit room. EOMI. No nystagmus. No ptosis. V1-V3 intact B/L to light touch.No pathological facial asymmetry. No flattening of the nasolabial fold. .  Hearing grossly intact.  Tongue midline. No atrophy or fasiculations of the tongue noted. Palate and uvula elevate symmetrically.  Shoulder shrug symmetric.  - Motor:  normal tone, normal bulk. No interosseous wasting. No flattening of hypothenar eminences.    Right Left     Motor Strength    5 5   Knee extensors 5 5    Pronator drift: No pronator drift   Index finger rolling: No orbiting.   Finger Taps: Finger taps are symmetric in rate and amplitude. .    Rapid movements: Rapid/fine movements are symmetric. As expected their dominant hand is slightly faster.   Leg Drift: none    Asterixis: No asterixis noted.   Tremor:   - Sensory:   Light touch:normal      - Cerebellum: No truncal ataxia. No titubations. No dysmetria, no dysdiadochokinesis. No overshoot.  Reflexes:    C5 C6 C7  L4 S1   R 2+ 2+  2+ 2+   L 2+ 2+  2+ 2+   Adductor Spread: No adductor spread noted.      Daphne's sign:absent   Nonsustained clonus: Absent   Sustained clonus: Absent   - Plantar response: flexor bilaterally    Medications:   LORazepam  1 mg Intravenous Once    timolol  1 drop Both Eyes BID    ergocalciferol  50,000 Units Oral Weekly    latanoprost  1 drop Both Eyes Nightly    rosuvastatin  10 mg Oral Nightly    donepezil  5 mg Oral QAM     metoprolol  5 mg Intravenous Q6H    thiamine  500 mg Intravenous TID    apixaban  2.5 mg Oral BID    losartan  50 mg Oral Daily    lidocaine-menthol  1 patch Transdermal Daily       PRNS:   ondansetron    hydrALAzine    OLANZapine (Zyprexa) 5 mg in sterile water for injection (PF) IM injection    acetaminophen    Infusions:    sodium chloride 100 mL/hr at 09/01/24 0324            Results:   Laboratory Data:  Lab Results   Component Value Date    WBC 4.2 08/31/2024    HGB 12.9 08/31/2024    HCT 39.1 08/31/2024    .0 08/31/2024    CREATSERUM 1.03 (H) 09/01/2024    BUN 16 09/01/2024     09/01/2024    K 3.3 (L) 09/01/2024     09/01/2024    CO2 20.0 (L) 09/01/2024    GLU 75 09/01/2024    CA 9.0 09/01/2024    ALB 3.9 08/29/2024    ALKPHO 67 08/29/2024    TP 6.9 08/29/2024    AST 42 (H) 08/29/2024    ALT 11 08/29/2024    TSH 2.438 08/29/2024    GGT 44 11/07/2017    MG 2.3 09/01/2024     09/01/2024    B12 1,294 (H) 08/19/2024     Recent Results (from the past 72 hour(s))   Potassium    Collection Time: 08/29/24 10:57 PM   Result Value Ref Range    Potassium 3.2 (L) 3.5 - 5.1 mmol/L   Basic Metabolic Panel (8)    Collection Time: 08/30/24  7:26 AM   Result Value Ref Range    Glucose 92 70 - 99 mg/dL    Sodium 144 136 - 145 mmol/L    Potassium 3.2 (L) 3.5 - 5.1 mmol/L    Chloride 115 (H) 98 - 112 mmol/L    CO2 22.0 21.0 - 32.0 mmol/L    Anion Gap 7 0 - 18 mmol/L    BUN 17 9 - 23 mg/dL    Creatinine 1.08 (H) 0.55 - 1.02 mg/dL    BUN/CREA Ratio 15.7 10.0 - 20.0    Calcium, Total 9.2 8.7 - 10.4 mg/dL    Calculated Osmolality 299 (H) 275 - 295 mOsm/kg    eGFR-Cr 49 (L) >=60 mL/min/1.73m2   CBC With Differential With Platelet    Collection Time: 08/30/24  7:26 AM   Result Value Ref Range    WBC 6.0 4.0 - 11.0 x10(3) uL    RBC 3.90 3.80 - 5.30 x10(6)uL    HGB 11.4 (L) 12.0 - 16.0 g/dL    HCT 34.5 (L) 35.0 - 48.0 %    MCV 88.5 80.0 - 100.0 fL    MCH 29.2 26.0 - 34.0 pg    MCHC 33.0 31.0 - 37.0 g/dL    RDW-SD  48.9 (H) 35.1 - 46.3 fL    RDW 15.2 (H) 11.0 - 15.0 %    .0 150.0 - 450.0 10(3)uL    Neutrophil Absolute Prelim 3.96 1.50 - 7.70 x10 (3) uL    Neutrophil Absolute 3.96 1.50 - 7.70 x10(3) uL    Lymphocyte Absolute 1.31 1.00 - 4.00 x10(3) uL    Monocyte Absolute 0.49 0.10 - 1.00 x10(3) uL    Eosinophil Absolute 0.14 0.00 - 0.70 x10(3) uL    Basophil Absolute 0.04 0.00 - 0.20 x10(3) uL    Immature Granulocyte Absolute 0.02 0.00 - 1.00 x10(3) uL    Neutrophil % 66.5 %    Lymphocyte % 22.0 %    Monocyte % 8.2 %    Eosinophil % 2.3 %    Basophil % 0.7 %    Immature Granulocyte % 0.3 %   Potassium    Collection Time: 08/30/24  7:26 AM   Result Value Ref Range    Potassium 3.2 (L) 3.5 - 5.1 mmol/L   Potassium    Collection Time: 08/31/24  7:21 AM   Result Value Ref Range    Potassium 2.9 (LL) 3.5 - 5.1 mmol/L   CBC With Differential With Platelet    Collection Time: 08/31/24  7:21 AM   Result Value Ref Range    WBC 4.2 4.0 - 11.0 x10(3) uL    RBC 4.43 3.80 - 5.30 x10(6)uL    HGB 12.9 12.0 - 16.0 g/dL    HCT 39.1 35.0 - 48.0 %    MCV 88.3 80.0 - 100.0 fL    MCH 29.1 26.0 - 34.0 pg    MCHC 33.0 31.0 - 37.0 g/dL    RDW-SD 48.2 (H) 35.1 - 46.3 fL    RDW 15.1 (H) 11.0 - 15.0 %    .0 150.0 - 450.0 10(3)uL    Neutrophil Absolute Prelim 2.89 1.50 - 7.70 x10 (3) uL    Neutrophil Absolute 2.89 1.50 - 7.70 x10(3) uL    Lymphocyte Absolute 0.70 (L) 1.00 - 4.00 x10(3) uL    Monocyte Absolute 0.43 0.10 - 1.00 x10(3) uL    Eosinophil Absolute 0.07 0.00 - 0.70 x10(3) uL    Basophil Absolute 0.05 0.00 - 0.20 x10(3) uL    Immature Granulocyte Absolute 0.02 0.00 - 1.00 x10(3) uL    Neutrophil % 69.5 %    Lymphocyte % 16.8 %    Monocyte % 10.3 %    Eosinophil % 1.7 %    Basophil % 1.2 %    Immature Granulocyte % 0.5 %   Basic Metabolic Panel (8)    Collection Time: 08/31/24  7:21 AM   Result Value Ref Range    Glucose 87 70 - 99 mg/dL    Sodium 140 136 - 145 mmol/L    Potassium 2.9 (LL) 3.5 - 5.1 mmol/L    Chloride 107 98 - 112  mmol/L    CO2 23.0 21.0 - 32.0 mmol/L    Anion Gap 10 0 - 18 mmol/L    BUN 12 9 - 23 mg/dL    Creatinine 0.95 0.55 - 1.02 mg/dL    BUN/CREA Ratio 12.6 10.0 - 20.0    Calcium, Total 9.6 8.7 - 10.4 mg/dL    Calculated Osmolality 289 275 - 295 mOsm/kg    eGFR-Cr 58 (L) >=60 mL/min/1.73m2   Magnesium    Collection Time: 08/31/24  7:21 AM   Result Value Ref Range    Magnesium 1.5 (L) 1.6 - 2.6 mg/dL   Potassium    Collection Time: 08/31/24  6:24 PM   Result Value Ref Range    Potassium 3.9 3.5 - 5.1 mmol/L   Magnesium    Collection Time: 09/01/24  8:07 AM   Result Value Ref Range    Magnesium 2.3 1.6 - 2.6 mg/dL   Basic Metabolic Panel (8)    Collection Time: 09/01/24  8:07 AM   Result Value Ref Range    Glucose 75 70 - 99 mg/dL    Sodium 141 136 - 145 mmol/L    Potassium 3.3 (L) 3.5 - 5.1 mmol/L    Chloride 111 98 - 112 mmol/L    CO2 20.0 (L) 21.0 - 32.0 mmol/L    Anion Gap 10 0 - 18 mmol/L    BUN 16 9 - 23 mg/dL    Creatinine 1.03 (H) 0.55 - 1.02 mg/dL    BUN/CREA Ratio 15.5 10.0 - 20.0    Calcium, Total 9.0 8.7 - 10.4 mg/dL    Calculated Osmolality 292 275 - 295 mOsm/kg    eGFR-Cr 52 (L) >=60 mL/min/1.73m2   RAINBOW DRAW LAVENDER    Collection Time: 09/01/24  8:07 AM   Result Value Ref Range    Hold Lavender Auto Resulted    CK (Creatine Kinase) (Not Creatinine)    Collection Time: 09/01/24  8:07 AM   Result Value Ref Range      U/L U/L         Test results/Imaging:   CT BRAIN OR HEAD (CPT=70450)    Result Date: 8/29/2024  CONCLUSION:  1. No acute intracranial hemorrhage.  No acute intracranial CT abnormalities 2. Cerebral cortical atrophy, prominently frontal temporal regions. 3. Mild cerebellar atrophy age appropriate. 4. Asymmetric soft tissue subcutaneous swelling/calvarial hematoma right temporal parietal and left posterior parietal region has developed suggesting subacute swelling/hematoma 5. Chronic appearing left mastoid effusions.     Dictated by (CST): Houston Bradley MD on 8/29/2024 at 2:35  PM     Finalized by (CST): Houston Bradley MD on 8/29/2024 at 2:46 PM                Performed an independent visualization of:  CT brain WO   Imaging revealed: Agree with radiology read.    Education/Instructions given to: patient   Barriers to Learning:None  Content: Refer to note above. Evaluation/Outcome: Verbalized understanding    Disclaimer:   This record was dictated using Dragon software. There may be errors due to voice recognition problems that were not realized and corrected during the completion of the note.      This document is not intended to support charting by exception.  Sections left blank in a completed note should be presumed not to have been done.     Total time spent in the care of the patient including reviewing the prior records, reviewing the prior imaging, discussing the case with the RN discussing case with patient's brother on the hospital floor and face to face time was 35 minutes, more than 50% of the time was spent in counseling and/or coordination of care related to dementia.    Thank you.  Gerard Herrera D.O.   Vascular & General Neurology     09/01/24

## 2024-09-01 NOTE — PROGRESS NOTES
Progress Note  Karen Finney Patient Status:  Inpatient    1935 MRN [id number]   Location [facility] 3W/SW Attending Josse Fraire MD   Hosp Day # 3 PCP LORENZO AYALA MD     Subjective:  Oriented to self only. No acute distress.     Objective:  /78 (BP Location: Left arm)   Pulse 75   Temp 97.6 °F (36.4 °C) (Axillary)   Resp 18   Wt 122 lb 3.2 oz (55.4 kg)   SpO2 100%   BMI 23.87 kg/m²     Telemetry: afib      Intake/Output:    Intake/Output Summary (Last 24 hours) at 2024 0613  Last data filed at 2024 0450  Gross per 24 hour   Intake 260 ml   Output 900 ml   Net -640 ml       Last 3 Weights   24 0500 122 lb 3.2 oz (55.4 kg)   24 0541 124 lb 12.8 oz (56.6 kg)   24 1752 122 lb 11.2 oz (55.7 kg)   24 1517 123 lb (55.8 kg)   10/26/22 1620 140 lb (63.5 kg)       Labs:  Recent Labs   Lab 24  1216 24  2257 24  0726 24  0721 24  1824   GLU 97  --  92 87  --    BUN 17  --  17 12  --    CREATSERUM 1.27*  --  1.08* 0.95  --    EGFRCR 41*  --  49* 58*  --    CA 10.4  --  9.2 9.6  --      --  144 140  --    K 3.0*   < > 3.2*  3.2* 2.9*  2.9* 3.9     --  115* 107  --    CO2 25.0  --  22.0 23.0  --     < > = values in this interval not displayed.     Recent Labs   Lab 24  1216 24  0726 24  0721   RBC 5.09 3.90 4.43   HGB 14.8 11.4* 12.9   HCT 44.4 34.5* 39.1   MCV 87.2 88.5 88.3   MCH 29.1 29.2 29.1   MCHC 33.3 33.0 33.0   RDW 14.8 15.2* 15.1*   NEPRELIM 5.31 3.96 2.89   WBC 7.0 6.0 4.2   .0 232.0 233.0         Recent Labs   Lab 24  1216   *       Unable to complete ros    Physical Exam:    Gen: alert, NAD  Heent: pupils equal, reactive. Mucous membranes moist.   Neck: no jvd  Cardiac: irregularly irregular, normal S1,S2, 2/6 hsm  Lungs: CTA  Abd: soft, NT/ND +bs  Ext: no sig ble edema  Skin: Warm, dry  Neuro: No focal deficits. Baseline dementia.         Medications:     rosuvastatin   10 mg Oral Nightly    metoprolol  5 mg Intravenous Q6H    thiamine  500 mg Intravenous TID    apixaban  2.5 mg Oral BID    losartan  50 mg Oral Daily    lidocaine-menthol  1 patch Transdermal Daily      sodium chloride 100 mL/hr at 09/01/24 0324           Assessment:  Confusion, weakness  CT head with evidence of subcutaneous hematoma, no fracture  Difficulty caring for herself at home.   MRI brain pending  Permanent Afib, rates controlled  Bb, eliquis  May need to reassess long term AC given fall risk and age. Neuro note reviewed. Ok to continue for now.  Echo 8/30-LVEF 55-60%, severe MR  CAD s/p PCI 2012  Asa, statin  Normal nuc 2019  HTN-controlled  IV bb. Refusing oral meds.   HL-statin,   Advanced dementia   Severe MR  CKD-creat below recent baseline.   Hypokalemia, hypomagnesemia-replaced    Plan:  Changed to IV lopressor yesterday due to patient refusal of oral meds.   MRI brain planned.   Eliquis held as patient not cooperative with oral meds. Long term, may need to reassess safety of oral AC and ability to be compliant.   Recheck Mg, K today. Replace per protocol   On IVF as not taking adequate po. Follow vol status closely with severe MR.  Will need to determine long term plan as patient refusing meds and po intake.      Plan of care discussed with patient, RN.    Vanessa Coyle, APRN  9/1/2024  6:13 AM        =======================================================  Note reviewed and labs reviewed.  Agree with above assessment and plan.    I have personally performed the medical decision making in its entirety. My additions include: None.    D/w REJI Coyle.    Manjeet Seymour MD

## 2024-09-01 NOTE — PROGRESS NOTES
Pharmacy Note: Dietary Supplement Discontinuation Per Policy    Valencia capsules have been discontinued on Karen C Finney per policy. This supplement may be restarted upon discharge using the medication reconciliation process.    Thank you,   Crystal Bowman, PharmD  9/1/2024,  11:41 AM

## 2024-09-02 ENCOUNTER — APPOINTMENT (OUTPATIENT)
Dept: MRI IMAGING | Facility: HOSPITAL | Age: 89
End: 2024-09-02
Attending: HOSPITALIST
Payer: MEDICARE

## 2024-09-02 VITALS
BODY MASS INDEX: 23 KG/M2 | HEART RATE: 107 BPM | SYSTOLIC BLOOD PRESSURE: 120 MMHG | DIASTOLIC BLOOD PRESSURE: 69 MMHG | RESPIRATION RATE: 18 BRPM | TEMPERATURE: 98 F | WEIGHT: 120.19 LBS | OXYGEN SATURATION: 98 %

## 2024-09-02 LAB
FOLATE SERPL-MCNC: 10.1 NG/ML (ref 5.4–?)
POTASSIUM SERPL-SCNC: 3.3 MMOL/L (ref 3.5–5.1)

## 2024-09-02 PROCEDURE — 99239 HOSP IP/OBS DSCHRG MGMT >30: CPT | Performed by: STUDENT IN AN ORGANIZED HEALTH CARE EDUCATION/TRAINING PROGRAM

## 2024-09-02 PROCEDURE — 99232 SBSQ HOSP IP/OBS MODERATE 35: CPT | Performed by: OTHER

## 2024-09-02 PROCEDURE — 70551 MRI BRAIN STEM W/O DYE: CPT | Performed by: HOSPITALIST

## 2024-09-02 RX ORDER — DONEPEZIL HYDROCHLORIDE 10 MG/1
TABLET, FILM COATED ORAL
Qty: 105 TABLET | Refills: 0 | Status: SHIPPED | OUTPATIENT
Start: 2024-09-03 | End: 2025-01-01

## 2024-09-02 RX ORDER — ALPRAZOLAM 0.25 MG
0.25 TABLET ORAL NIGHTLY PRN
Status: DISCONTINUED | OUTPATIENT
Start: 2024-09-02 | End: 2024-09-02

## 2024-09-02 NOTE — CM/SW NOTE
09/02/24 1606   Discharge disposition   Expected discharge disposition subacute   Post Acute Care Provider Trenton Bonilla  ([facility] [place])   Discharge transportation [facility]       Received call from dedrick Ross - pt can transfer to [facility] today - agreeable to 5PM time.    RN Adelaide and DC RN oHlly updated. MD also updated. Requested RN or DC RN update pt's family.    SW spoke to [facility] ([facility], Dementia) set for ETA 5PM. PCS completed in [facility] - pt's RN to print w/ pt's AVS.    Room #: 221 B  Report phone #: [phone]    PLAN: [facility], Ambulance ETA 5PM, PCS done          FRANCISCO JAVIER Diaz, LSW [id number]

## 2024-09-02 NOTE — PLAN OF CARE
Karen is Alert to self and situation. Was able to take all medications offered. Awaiting MRI. Had visits with brother during the day. Was pleasant to staff during day shift. K+ replaced per protocol. Expressed desire to have pill form of medication tomorrow when she became aware of IV medication being given, but was fine receiving it as prescribed today. Frequent rounding by staff, call light within reach.     Problem: Patient Centered Care  Goal: Patient preferences are identified and integrated in the patient's plan of care  Description: Interventions:  - What would you like us to know as we care for you? I love my puppy  - Provide timely, complete, and accurate information to patient/family  - Incorporate patient and family knowledge, values, beliefs, and cultural backgrounds into the planning and delivery of care  - Encourage patient/family to participate in care and decision-making at the level they choose  - Honor patient and family perspectives and choices  Outcome: Progressing     Problem: Patient/Family Goals  Goal: Patient/Family Long Term Goal  Description: Patient's Long Term Goal: discharge    Interventions:  - Monitor vital signs, labs, imaging  - PT/OT  - replace electrolytes per protocol  - activity as tolerated  - See additional Care Plan goals for specific interventions  Outcome: Progressing  Goal: Patient/Family Short Term Goal  Description: Patient's Short Term Goal: activity as tolerated    Interventions:   - PT/OT  - up to chair  - use of assistive devices  - See additional Care Plan goals for specific interventions  Outcome: Progressing     Problem: CARDIOVASCULAR - ADULT  Goal: Maintains optimal cardiac output and hemodynamic stability  Description: INTERVENTIONS:  - Monitor vital signs, rhythm, and trends  - Monitor for bleeding, hypotension and signs of decreased cardiac output  - Evaluate effectiveness of vasoactive medications to optimize hemodynamic stability  - Monitor arterial and/or  venous puncture sites for bleeding and/or hematoma  - Assess quality of pulses, skin color and temperature  - Assess for signs of decreased coronary artery perfusion - ex. Angina  - Evaluate fluid balance, assess for edema, trend weights  Outcome: Progressing  Goal: Absence of cardiac arrhythmias or at baseline  Description: INTERVENTIONS:  - Continuous cardiac monitoring, monitor vital signs, obtain 12 lead EKG if indicated  - Evaluate effectiveness of antiarrhythmic and heart rate control medications as ordered  - Initiate emergency measures for life threatening arrhythmias  - Monitor electrolytes and administer replacement therapy as ordered  Outcome: Progressing     Problem: NEUROLOGICAL - ADULT  Goal: Achieves stable or improved neurological status  Description: INTERVENTIONS  - Assess for and report changes in neurological status  - Initiate measures to prevent increased intracranial pressure  - Maintain blood pressure and fluid volume within ordered parameters to optimize cerebral perfusion and minimize risk of hemorrhage  - Monitor temperature, glucose, and sodium. Initiate appropriate interventions as ordered  Outcome: Progressing  Goal: Achieves maximal functionality and self care  Description: INTERVENTIONS  - Monitor swallowing and airway patency with patient fatigue and changes in neurological status  - Encourage and assist patient to increase activity and self care with guidance from PT/OT  - Encourage visually impaired, hearing impaired and aphasic patients to use assistive/communication devices  Outcome: Progressing     Problem: Impaired Functional Mobility  Goal: Achieve highest/safest level of mobility/gait  Description: Interventions:  - Assess patient's functional ability and stability  - Promote increasing activity/tolerance for mobility and gait  - Educate and engage patient/family in tolerated activity level and precautions  - Recommend use of  RW for transfers and ambulation  Outcome:  Progressing     Problem: SAFETY ADULT - FALL  Goal: Free from fall injury  Description: INTERVENTIONS:  - Assess pt frequently for physical needs  - Identify cognitive and physical deficits and behaviors that affect risk of falls.  - [place] fall precautions as indicated by assessment.  - Educate pt/family on patient safety including physical limitations  - Instruct pt to call for assistance with activity based on assessment  - Modify environment to reduce risk of injury  - Provide assistive devices as appropriate  - Consider OT/PT consult to assist with strengthening/mobility  - Encourage toileting schedule  Outcome: Progressing     Problem: DISCHARGE PLANNING  Goal: Discharge to home or other facility with appropriate resources  Description: INTERVENTIONS:  - Identify barriers to discharge w/pt and caregiver  - Include patient/family/discharge partner in discharge planning  - Arrange for needed discharge resources and transportation as appropriate  - Identify discharge learning needs (meds, wound care, etc)  - Arrange for interpreters to assist at discharge as needed  - Consider post-discharge preferences of patient/family/discharge partner  - Complete POLST form as appropriate  - Assess patient's ability to be responsible for managing their own health  - Refer to Case Management Department for coordinating discharge planning if the patient needs post-hospital services based on physician/LIP order or complex needs related to functional status, cognitive ability or social support system  Outcome: Progressing

## 2024-09-02 NOTE — DISCHARGE PLANNING
Pt ready and cleared for discharge. IV and tele removed. Pt education and paperwork given to EMS. Pt brother Dameon updated on plan of care. Pt discharging to [facility] via ambulance. Report given to Antonino REDDING.

## 2024-09-02 NOTE — PROGRESS NOTES
Progress Note  Karen Finney Patient Status:  Inpatient    1935 MRN [id number]   Location [facility] 3W/SW Attending Josse Fraire MD   Hosp Day # 4 PCP LORENZO AYALA MD     Subjective:  Sleeping, received ativan.     Objective:  /84 (BP Location: Left arm)   Pulse 118   Temp 98.6 °F (37 °C) (Axillary)   Resp 20   Wt 122 lb 3.2 oz (55.4 kg)   SpO2 99%   BMI 23.87 kg/m²     Telemetry: afib      Intake/Output:    Intake/Output Summary (Last 24 hours) at 2024 0610  Last data filed at 2024 0518  Gross per 24 hour   Intake 3180 ml   Output 2350 ml   Net 830 ml       Last 3 Weights   24 0634 122 lb 3.2 oz (55.4 kg)   24 0500 122 lb 3.2 oz (55.4 kg)   24 0541 124 lb 12.8 oz (56.6 kg)   24 1752 122 lb 11.2 oz (55.7 kg)   24 1517 123 lb (55.8 kg)   10/26/22 1620 140 lb (63.5 kg)       Labs:  Recent Labs   Lab 24  0726 24  0721 24  1824 24  0807   GLU 92 87  --  75   BUN 17 12  --  16   CREATSERUM 1.08* 0.95  --  1.03*   EGFRCR 49* 58*  --  52*   CA 9.2 9.6  --  9.0    140  --  141   K 3.2*  3.2* 2.9*  2.9* 3.9 3.3*   * 107  --  111   CO2 22.0 23.0  --  20.0*     Recent Labs   Lab 24  1216 24  0726 24  0721   RBC 5.09 3.90 4.43   HGB 14.8 11.4* 12.9   HCT 44.4 34.5* 39.1   MCV 87.2 88.5 88.3   MCH 29.1 29.2 29.1   MCHC 33.3 33.0 33.0   RDW 14.8 15.2* 15.1*   NEPRELIM 5.31 3.96 2.89   WBC 7.0 6.0 4.2   .0 232.0 233.0         Recent Labs   Lab 24  1216 24  0807   * 136       Unable to complete ros    Physical Exam:    Gen: sleeping NAD  Heent: pupils equal, reactive. Mucous membranes moist.   Neck: no jvd  Cardiac: irregularly irregular, normal S1,S2, 2/6 hsm  Lungs: CTA  Abd: soft, NT/ND +bs  Ext: no sig ble edema  Skin: Warm, dry  Neuro: No focal deficits. Baseline dementia.         Medications:     timolol  1 drop Both Eyes BID    ergocalciferol  50,000 Units Oral Weekly     latanoprost  1 drop Both Eyes Nightly    rosuvastatin  10 mg Oral Nightly    donepezil  5 mg Oral QAM    metoprolol  5 mg Intravenous Q6H    thiamine  500 mg Intravenous TID    apixaban  2.5 mg Oral BID    losartan  50 mg Oral Daily    lidocaine-menthol  1 patch Transdermal Daily      sodium chloride 100 mL/hr at 09/01/24 8421           Assessment:  Confusion, weakness  CT head with evidence of subcutaneous hematoma, no fracture  Difficulty caring for herself at home.   MRI brain pending  Permanent Afib, rates controlled  Bb, eliquis  May need to reassess long term AC given fall risk and age. Neuro note reviewed. Ok to continue for now.  Echo 8/30-LVEF 55-60%, severe MR  CAD s/p PCI 2012  Asa, statin  Normal nuc 2019  HTN-controlled  IV bb. Refusing oral meds.   HL-statin,   Advanced dementia   Severe MR  CKD-creat below recent baseline.   Hypokalemia, hypomagnesemia-replaced    Plan:  Cont IV lopressor until taking PO. Rates elevated overnight with anxiety, now controlled.  MRI brain completed this am. Did receive ativan to tolerate.   Eliquis has been resumed although patient intermittently refusing meds. Long term, may need to reassess safety of oral AC and ability to be compliant.   Stop IVF    Plan of care discussed with patient, RN.    Vanessa Coyle, APRN  9/2/2024  6:10 AM      I saw and examined the patient agree with attached findings.  Occasional elevated rates  with agitation/anxiety.  Continue IV metoprolol until able to take p.o.    Kevin Peña MD  [facility] [place]

## 2024-09-02 NOTE — PLAN OF CARE
Patient is oriented to self, no complaints of pain, on room air, PRN hydralazine given for HTN. MRI brain done. PRN xanax and ativan given.  Bed is locked and in lowest position. Call light is within reach.  Problem: Patient Centered Care  Goal: Patient preferences are identified and integrated in the patient's plan of care  Description: Interventions:  - What would you like us to know as we care for you? I love my puppy  - Provide timely, complete, and accurate information to patient/family  - Incorporate patient and family knowledge, values, beliefs, and cultural backgrounds into the planning and delivery of care  - Encourage patient/family to participate in care and decision-making at the level they choose  - Honor patient and family perspectives and choices  Outcome: Progressing     Problem: Patient/Family Goals  Goal: Patient/Family Long Term Goal  Description: Patient's Long Term Goal: discharge    Interventions:  - Monitor vital signs, labs, imaging  - PT/OT  - replace electrolytes per protocol  - activity as tolerated  - See additional Care Plan goals for specific interventions  Outcome: Progressing  Goal: Patient/Family Short Term Goal  Description: Patient's Short Term Goal: activity as tolerated    Interventions:   - PT/OT  - up to chair  - use of assistive devices  - See additional Care Plan goals for specific interventions  Outcome: Progressing     Problem: CARDIOVASCULAR - ADULT  Goal: Maintains optimal cardiac output and hemodynamic stability  Description: INTERVENTIONS:  - Monitor vital signs, rhythm, and trends  - Monitor for bleeding, hypotension and signs of decreased cardiac output  - Evaluate effectiveness of vasoactive medications to optimize hemodynamic stability  - Monitor arterial and/or venous puncture sites for bleeding and/or hematoma  - Assess quality of pulses, skin color and temperature  - Assess for signs of decreased coronary artery perfusion - ex. Angina  - Evaluate fluid balance,  assess for edema, trend weights  Outcome: Progressing  Goal: Absence of cardiac arrhythmias or at baseline  Description: INTERVENTIONS:  - Continuous cardiac monitoring, monitor vital signs, obtain 12 lead EKG if indicated  - Evaluate effectiveness of antiarrhythmic and heart rate control medications as ordered  - Initiate emergency measures for life threatening arrhythmias  - Monitor electrolytes and administer replacement therapy as ordered  Outcome: Progressing     Problem: NEUROLOGICAL - ADULT  Goal: Achieves stable or improved neurological status  Description: INTERVENTIONS  - Assess for and report changes in neurological status  - Initiate measures to prevent increased intracranial pressure  - Maintain blood pressure and fluid volume within ordered parameters to optimize cerebral perfusion and minimize risk of hemorrhage  - Monitor temperature, glucose, and sodium. Initiate appropriate interventions as ordered  Outcome: Progressing  Goal: Achieves maximal functionality and self care  Description: INTERVENTIONS  - Monitor swallowing and airway patency with patient fatigue and changes in neurological status  - Encourage and assist patient to increase activity and self care with guidance from PT/OT  - Encourage visually impaired, hearing impaired and aphasic patients to use assistive/communication devices  Outcome: Progressing     Problem: Impaired Functional Mobility  Goal: Achieve highest/safest level of mobility/gait  Description: Interventions:  - Assess patient's functional ability and stability  - Promote increasing activity/tolerance for mobility and gait  - Educate and engage patient/family in tolerated activity level and precautions    Outcome: Progressing     Problem: SAFETY ADULT - FALL  Goal: Free from fall injury  Description: INTERVENTIONS:  - Assess pt frequently for physical needs  - Identify cognitive and physical deficits and behaviors that affect risk of falls.  - [place] fall precautions as  indicated by assessment.  - Educate pt/family on patient safety including physical limitations  - Instruct pt to call for assistance with activity based on assessment  - Modify environment to reduce risk of injury  - Provide assistive devices as appropriate  - Consider OT/PT consult to assist with strengthening/mobility  - Encourage toileting schedule  Outcome: Progressing     Problem: DISCHARGE PLANNING  Goal: Discharge to home or other facility with appropriate resources  Description: INTERVENTIONS:  - Identify barriers to discharge w/pt and caregiver  - Include patient/family/discharge partner in discharge planning  - Arrange for needed discharge resources and transportation as appropriate  - Identify discharge learning needs (meds, wound care, etc)  - Arrange for interpreters to assist at discharge as needed  - Consider post-discharge preferences of patient/family/discharge partner  - Complete POLST form as appropriate  - Assess patient's ability to be responsible for managing their own health  - Refer to Case Management Department for coordinating discharge planning if the patient needs post-hospital services based on physician/LIP order or complex needs related to functional status, cognitive ability or social support system  Outcome: Progressing

## 2024-09-02 NOTE — CM/SW NOTE
01:35PM  Received message from VAHID Bryson and MD - pt is cleared for DC today.    SW [facility] - [facility]. Clinical updates also sent.    03:25PM  CRISTA contacted liaev Ross via phone - she will f/up w/ building to confirm transfer and call SW back.    PLAN: [facility] - pending response from REYNALDO      SW/CM to remain available for support and/or discharge planning.       MS EmilyW, LSW [id number]

## 2024-09-02 NOTE — PLAN OF CARE
Pt alert and oriented x1. Max assist. PRN Hydralazine given. Potassium replaced per protocol. Pt brother updated on plan of care. Plan to discharge to [facility] when cleared. Safety precautions in place. Call light within reach.    Problem: Patient Centered Care  Goal: Patient preferences are identified and integrated in the patient's plan of care  Description: Interventions:  - What would you like us to know as we care for you? I love my puppy  - Provide timely, complete, and accurate information to patient/family  - Incorporate patient and family knowledge, values, beliefs, and cultural backgrounds into the planning and delivery of care  - Encourage patient/family to participate in care and decision-making at the level they choose  - Honor patient and family perspectives and choices  Outcome: Progressing     Problem: Patient/Family Goals  Goal: Patient/Family Long Term Goal  Description: Patient's Long Term Goal: discharge    Interventions:  - Monitor vital signs, labs, imaging  - PT/OT  - replace electrolytes per protocol  - activity as tolerated  - See additional Care Plan goals for specific interventions  Outcome: Progressing  Goal: Patient/Family Short Term Goal  Description: Patient's Short Term Goal: activity as tolerated    Interventions:   - PT/OT  - up to chair  - use of assistive devices  - See additional Care Plan goals for specific interventions  Outcome: Progressing     Problem: CARDIOVASCULAR - ADULT  Goal: Maintains optimal cardiac output and hemodynamic stability  Description: INTERVENTIONS:  - Monitor vital signs, rhythm, and trends  - Monitor for bleeding, hypotension and signs of decreased cardiac output  - Evaluate effectiveness of vasoactive medications to optimize hemodynamic stability  - Monitor arterial and/or venous puncture sites for bleeding and/or hematoma  - Assess quality of pulses, skin color and temperature  - Assess for signs of decreased coronary artery perfusion - ex. Angina  -  Evaluate fluid balance, assess for edema, trend weights  Outcome: Progressing  Goal: Absence of cardiac arrhythmias or at baseline  Description: INTERVENTIONS:  - Continuous cardiac monitoring, monitor vital signs, obtain 12 lead EKG if indicated  - Evaluate effectiveness of antiarrhythmic and heart rate control medications as ordered  - Initiate emergency measures for life threatening arrhythmias  - Monitor electrolytes and administer replacement therapy as ordered  Outcome: Progressing     Problem: NEUROLOGICAL - ADULT  Goal: Achieves stable or improved neurological status  Description: INTERVENTIONS  - Assess for and report changes in neurological status  - Initiate measures to prevent increased intracranial pressure  - Maintain blood pressure and fluid volume within ordered parameters to optimize cerebral perfusion and minimize risk of hemorrhage  - Monitor temperature, glucose, and sodium. Initiate appropriate interventions as ordered  Outcome: Progressing  Goal: Achieves maximal functionality and self care  Description: INTERVENTIONS  - Monitor swallowing and airway patency with patient fatigue and changes in neurological status  - Encourage and assist patient to increase activity and self care with guidance from PT/OT  - Encourage visually impaired, hearing impaired and aphasic patients to use assistive/communication devices  Outcome: Progressing     Problem: Impaired Functional Mobility  Goal: Achieve highest/safest level of mobility/gait  Description: Interventions:  - Assess patient's functional ability and stability  - Promote increasing activity/tolerance for mobility and gait  - Educate and engage patient/family in tolerated activity level and precautions  - Recommend patient transfer to bedside chair toward strongest side  Outcome: Progressing     Problem: SAFETY ADULT - FALL  Goal: Free from fall injury  Description: INTERVENTIONS:  - Assess pt frequently for physical needs  - Identify cognitive and  physical deficits and behaviors that affect risk of falls.  - [facility] fall precautions as indicated by assessment.  - Educate pt/family on patient safety including physical limitations  - Instruct pt to call for assistance with activity based on assessment  - Modify environment to reduce risk of injury  - Provide assistive devices as appropriate  - Consider OT/PT consult to assist with strengthening/mobility  - Encourage toileting schedule  Outcome: Progressing     Problem: DISCHARGE PLANNING  Goal: Discharge to home or other facility with appropriate resources  Description: INTERVENTIONS:  - Identify barriers to discharge w/pt and caregiver  - Include patient/family/discharge partner in discharge planning  - Arrange for needed discharge resources and transportation as appropriate  - Identify discharge learning needs (meds, wound care, etc)  - Arrange for interpreters to assist at discharge as needed  - Consider post-discharge preferences of patient/family/discharge partner  - Complete POLST form as appropriate  - Assess patient's ability to be responsible for managing their own health  - Refer to Case Management Department for coordinating discharge planning if the patient needs post-hospital services based on physician/LIP order or complex needs related to functional status, cognitive ability or social support system  Outcome: Progressing

## 2024-09-02 NOTE — PROGRESS NOTES
[facility]  [address] [place] [address], [address]  [facility] [address]  [phone]        INPATIENT NEUROLOGY   FOLLOW UP PROGRESS NOTE  [facility]  part of formerly [facility]    Karen Finney Patient Status:  Inpatient     1935 MRN [id number]    Location [facility] 3W/SW Attending Es Cabello MD    Hosp Day # 4 PCP LORENZO AYALA MD    Date of Admission:  2024  Date of Consult Follow Up: 24       Assessment and Plan:   Karen Finney is a 89 year old right handed female w/ a pmhx sig. for   cognitive impairment, CAD status post stent, atrial fibrillation, hypertension, hyperlipidemia who was admitted to the hospital after being found down by her brother.  Had last been seen well several days ago-but brother at bedside states that he has overall seen a gradual decline in her functioning over the last year.  Realizing now that she may not have been taking her medicines correctly or at all in recent months.      Overall, seems like there is a more chronic cognitive impairment with progression noted over the past year which has made it difficult for her to manage on her own.  Exam currently is nonfocal making stroke less likely, but she has been off all of her medicines so cannot fully be excluded.  Patient was also complaining of headaches.  Currently she is sleeping and disoriented.  Advised her brother and the RN to try to keep the patient awake during the day to avoid inverting her sleep-wake cycle.  Will order delirium precautions.  Okay to continue apixaban.  Await MRI. Stared on aricept on 24.  Exam is significantly improved on 2024.  MRI of the brain was negative for any acute infarct.  Will continue Aricept as an outpatient.  Folate level pending.  If low can replete.  No further workup per neurology.  Will sign off.  Outpatient follow-up with Dr. Linder.    Likely undiagnosed late onset  Alzheimer's disease  Differential  Diagnosis:  Low suspicion the patient has had an acute cerebrovascular event     Plan    Diagnostics:  MRI brain  Therapeutics:  Delirium precautions.  Neurochecks Q4  Continue Aricept 5 mg every morning for 1 month.  Afterwards increased to 10 mg every morning.  Adverse effects to monitor for include GI upset and bradycardia.      Procedures    CBC With Differential With Platelet    Comp Metabolic Panel (14)    CK (Creatine Kinase) (Not Creatinine)    TSH W Reflex To Free T4    Urinalysis with Culture Reflex    Magnesium    Basic Metabolic Panel (8)    CBC With Differential With Platelet    Potassium    Potassium    Potassium    CBC With Differential With Platelet    Basic Metabolic Panel (8)    Potassium    Magnesium    Magnesium    XR CHEST AP PORTABLE  (CPT=71045)    CT BRAIN OR HEAD (CPT=70450)    XR PELVIS (1 VIEW) (CPT=72170)    MRI BRAIN (CPT=70551)           INTERVAL EVENTS  08/31/24:  could not tolerate first attempt at mri   09/01/24: more awake         SUBJECTIVE:   More somnolent after being given Ativan for MRI.  Briefly able to maintain wakefulness.  Otherwise sleeping and snoring.  Denies any complaints.       Pertinent positive and negatives per HPI.  All others were reviewed and negative.       Objective   OBJECTIVE:   Last vitals and weight :  Blood pressure 158/74, pulse 84, temperature 98.6 °F (37 °C), temperature source Axillary, resp. rate 20, weight 120 lb 3.2 oz (54.5 kg), SpO2 98%, not currently breastfeeding.   Vitals:    09/02/24 0628 09/02/24 0644 09/02/24 0934 09/02/24 1159   BP: 120/72  (!) 147/99 158/74   BP Location: Right arm  Right arm Left arm   Pulse: 93  82 84   Resp:   20    Temp:       TempSrc:       SpO2:   98%    Weight:  120 lb 3.2 oz (54.5 kg)        Exam:  - General: appears older than stated age and no distress     - Pulmonary: no sign of respiratory distress.   Neurologic Exam  - Mental Status: Can maintain wakefulness without repeated stimulation.   Briefly regards.     - Cranial Nerves: No gaze preference. No pathological facial asymmetry. No flattening of the nasolabial fold. .   - Motor:  normal tone, normal bulk. moving extremities spontaneously    - Sensory:   Light touch:normal      - Cerebellum: No truncal ataxia. No titubations. No dysmetria, no dysdiadochokinesis. No overshoot.       Medications:   potassium chloride  40 mEq Intravenous Once    timolol  1 drop Both Eyes BID    ergocalciferol  50,000 Units Oral Weekly    latanoprost  1 drop Both Eyes Nightly    rosuvastatin  10 mg Oral Nightly    donepezil  5 mg Oral QAM    metoprolol  5 mg Intravenous Q6H    thiamine  500 mg Intravenous TID    apixaban  2.5 mg Oral BID    losartan  50 mg Oral Daily    lidocaine-menthol  1 patch Transdermal Daily       PRNS:   ALPRAZolam    ondansetron    hydrALAzine    OLANZapine (Zyprexa) 5 mg in sterile water for injection (PF) IM injection    acetaminophen    Infusions:              Results:   Laboratory Data:  Lab Results   Component Value Date    WBC 4.2 08/31/2024    HGB 12.9 08/31/2024    HCT 39.1 08/31/2024    .0 08/31/2024    CREATSERUM 1.03 (H) 09/01/2024    BUN 16 09/01/2024     09/01/2024    K 3.3 (L) 09/02/2024     09/01/2024    CO2 20.0 (L) 09/01/2024    GLU 75 09/01/2024    CA 9.0 09/01/2024    ALB 3.9 08/29/2024    ALKPHO 67 08/29/2024    TP 6.9 08/29/2024    AST 42 (H) 08/29/2024    ALT 11 08/29/2024    TSH 2.438 08/29/2024    GGT 44 11/07/2017    MG 2.3 09/01/2024     09/01/2024    B12 1,294 (H) 08/19/2024     Recent Results (from the past 72 hour(s))   Potassium    Collection Time: 08/31/24  7:21 AM   Result Value Ref Range    Potassium 2.9 (LL) 3.5 - 5.1 mmol/L   CBC With Differential With Platelet    Collection Time: 08/31/24  7:21 AM   Result Value Ref Range    WBC 4.2 4.0 - 11.0 x10(3) uL    RBC 4.43 3.80 - 5.30 x10(6)uL    HGB 12.9 12.0 - 16.0 g/dL    HCT 39.1 35.0 - 48.0 %    MCV 88.3 80.0 - 100.0 fL    MCH 29.1 26.0 - 34.0 pg    MCHC 33.0  31.0 - 37.0 g/dL    RDW-SD 48.2 (H) 35.1 - 46.3 fL    RDW 15.1 (H) 11.0 - 15.0 %    .0 150.0 - 450.0 10(3)uL    Neutrophil Absolute Prelim 2.89 1.50 - 7.70 x10 (3) uL    Neutrophil Absolute 2.89 1.50 - 7.70 x10(3) uL    Lymphocyte Absolute 0.70 (L) 1.00 - 4.00 x10(3) uL    Monocyte Absolute 0.43 0.10 - 1.00 x10(3) uL    Eosinophil Absolute 0.07 0.00 - 0.70 x10(3) uL    Basophil Absolute 0.05 0.00 - 0.20 x10(3) uL    Immature Granulocyte Absolute 0.02 0.00 - 1.00 x10(3) uL    Neutrophil % 69.5 %    Lymphocyte % 16.8 %    Monocyte % 10.3 %    Eosinophil % 1.7 %    Basophil % 1.2 %    Immature Granulocyte % 0.5 %   Basic Metabolic Panel (8)    Collection Time: 08/31/24  7:21 AM   Result Value Ref Range    Glucose 87 70 - 99 mg/dL    Sodium 140 136 - 145 mmol/L    Potassium 2.9 (LL) 3.5 - 5.1 mmol/L    Chloride 107 98 - 112 mmol/L    CO2 23.0 21.0 - 32.0 mmol/L    Anion Gap 10 0 - 18 mmol/L    BUN 12 9 - 23 mg/dL    Creatinine 0.95 0.55 - 1.02 mg/dL    BUN/CREA Ratio 12.6 10.0 - 20.0    Calcium, Total 9.6 8.7 - 10.4 mg/dL    Calculated Osmolality 289 275 - 295 mOsm/kg    eGFR-Cr 58 (L) >=60 mL/min/1.73m2   Magnesium    Collection Time: 08/31/24  7:21 AM   Result Value Ref Range    Magnesium 1.5 (L) 1.6 - 2.6 mg/dL   Potassium    Collection Time: 08/31/24  6:24 PM   Result Value Ref Range    Potassium 3.9 3.5 - 5.1 mmol/L   Magnesium    Collection Time: 09/01/24  8:07 AM   Result Value Ref Range    Magnesium 2.3 1.6 - 2.6 mg/dL   Basic Metabolic Panel (8)    Collection Time: 09/01/24  8:07 AM   Result Value Ref Range    Glucose 75 70 - 99 mg/dL    Sodium 141 136 - 145 mmol/L    Potassium 3.3 (L) 3.5 - 5.1 mmol/L    Chloride 111 98 - 112 mmol/L    CO2 20.0 (L) 21.0 - 32.0 mmol/L    Anion Gap 10 0 - 18 mmol/L    BUN 16 9 - 23 mg/dL    Creatinine 1.03 (H) 0.55 - 1.02 mg/dL    BUN/CREA Ratio 15.5 10.0 - 20.0    Calcium, Total 9.0 8.7 - 10.4 mg/dL    Calculated Osmolality 292 275 - 295 mOsm/kg    eGFR-Cr 52 (L) >=60  mL/min/1.73m2   RAINBOW DRAW LAVENDER    Collection Time: 09/01/24  8:07 AM   Result Value Ref Range    Hold Lavender Auto Resulted    CK (Creatine Kinase) (Not Creatinine)    Collection Time: 09/01/24  8:07 AM   Result Value Ref Range      U/L U/L   Potassium    Collection Time: 09/02/24  7:26 AM   Result Value Ref Range    Potassium 3.3 (L) 3.5 - 5.1 mmol/L   RAINBOW DRAW LAVENDER    Collection Time: 09/02/24  8:20 AM   Result Value Ref Range    Hold Lavender Auto Resulted          Test results/Imaging:   MRI BRAIN (CPT=70551)    Result Date: 9/2/2024  CONCLUSION: No acute infarct  [facility] provided a prelim report for this exam. This final report has no significant discrepancies with the [facility] report.    Dictated by (CST): Winston Lewis MD on 9/02/2024 at 10:27 AM     Finalized by (CST): Winston Lewis MD on 9/02/2024 at 10:28 AM          CT BRAIN OR HEAD (CPT=70450)    Result Date: 8/29/2024  CONCLUSION:  1. No acute intracranial hemorrhage.  No acute intracranial CT abnormalities 2. Cerebral cortical atrophy, prominently frontal temporal regions. 3. Mild cerebellar atrophy age appropriate. 4. Asymmetric soft tissue subcutaneous swelling/calvarial hematoma right temporal parietal and left posterior parietal region has developed suggesting subacute swelling/hematoma 5. Chronic appearing left mastoid effusions.     Dictated by (CST): Houston Bradley MD on 8/29/2024 at 2:35 PM     Finalized by (CST): Houston Bradley MD on 8/29/2024 at 2:46 PM                Performed an independent visualization of:  CT brain WO , mri brain   Imaging revealed: Agree with radiology read.  MRI brain redemonstrates significant area of atrophy involving both of her frontal lobes bordering the interhemispheric fissure.    Education/Instructions given to: patient   Barriers to Learning:None  Content: Refer to note above. Evaluation/Outcome: Verbalized understanding    Disclaimer:   This record was dictated  using Dragon software. There may be errors due to voice recognition problems that were not realized and corrected during the completion of the note.      This document is not intended to support charting by exception.  Sections left blank in a completed note should be presumed not to have been done.     Total time spent in the care of the patient including reviewing the prior records, reviewing the prior imaging, discussing the case with the RN discussing case with patient's brother on the hospital floor and face to face time was 35 minutes, more than 50% of the time was spent in counseling and/or coordination of care related to dementia.    Thank you.  Gerard Herrera D.O.   Vascular & General Neurology  09/02/24

## 2024-09-02 NOTE — DISCHARGE SUMMARY
[facility]  part of [facility]    Discharge Summary    Karen Finney Patient Status:  Inpatient    1935 MRN [id number]   Location [facility] 3W/SW Attending Es Cabello MD   Hosp Day # 4 PCP LORENZO AYALA MD     Date of Admission: 2024   Date of Discharge: 2024    Admitting Diagnosis: Hypokalemia [E87.6]  Weakness generalized [R53.1]  Fall, initial encounter [W19.XXXA]    Disposition: Transfer to Rehab Facility:      Discharge Diagnosis: .Principal Problem:    Fall, initial encounter  Active Problems:    Fall    Weakness generalized    Hypokalemia    Acute encephalopathy    Dementia (HCC)      Hospital Course:   Reason for Admission: Acute encephalopathy     Discharge Physical Exam:     General: Elderly female, confused, disheveled. No acute distress. AxO1-2  Respiratory: Clear to auscultation bilaterally. No wheezes. No rhonchi.  Cardiovascular: S1, S2. Regular rate and rhythm. No murmurs, rubs or gallops.   Abdomen: Soft, nontender, nondistended.  Positive bowel sounds. No rebound or guarding.  Neurologic: No focal neurological deficits.   Musculoskeletal: Moves all extremities.  Extremities: No edema.  Psych: speaking with stuffed animal, feeding it food     Hospital Course:     Patient is an 89-year-old  female with history of chronic atrial fibrillation, hypertension, noncompliant with her medications who was seen by her primary care physician 2 weeks earlier and given a prescription of her medications. Her brother picked them up, but he doubts that she took them. Later that week when brother called her she didn't answer and then found her lying on the floor in her house (patient lived alone). Patient was alert, but could not recall for how long she had been on the floor. ED workup vitals stable,. CT brain showed no acute findings. There is soft tissue subcutaneous swelling. Calvarial hematoma, right temporoparietal and left posterior parietal  regions, has developed, suggesting subacute swelling or hematoma. MRI brain negative for acute findings. . EKG showed atrial fibrillation, rate between 90 and 110, chronic history.   Patient was admitted for weakness, and possible placement.   Neurology was consulted for encephalopathy, and based on history and exam, it was deemed she had progression of her dementia, likely undiagnosed late onset Alzheimer's disease. During hospitalization she was seen hold and talking to stuffed puppy animal as if it was a real pet. She appeared disheveled and AxO1-2. Discussed with brother that patient unsafe to live alone, and would  benefit from outpatient palliative consult/evaluation to discuss goals of care, advance care planning. Brother was only known family. Brother expressed understanding.  provided list of Assisted living facilities for him to look into.   On discharge she was transferred to acute rehab.   Eliquis for Afib stopped due to falls and concern for compliancy with dementia.   Was recommended to continue aricept as outpatient.   Outpatient neurology appointment provided.       Complications: None    Consultants         Provider   Role Specialty     Staci Linder MD      Consulting Physician NEUROLOGY     Negro Ramirez APRN      Consulting Physician Nurse Practitioner Family                Discharge Plan:   Discharge Condition: Stable    Current Discharge Medication List        Home Meds - Modified    Details   donepezil 10 MG Oral Tab Take 0.5 tablets (5 mg total) by mouth every morning for 30 days, THEN 1 tablet (10 mg total) every morning.           Home Meds - Unchanged    Details   Potassium Chloride ER 20 MEQ Oral Tab CR Take 20 mEq by mouth daily.      ergocalciferol 1.25 MG (99304 UT) Oral Cap Take 1 capsule (50,000 Units total) by mouth once a week.      rosuvastatin 10 MG Oral Tab Take 1 tablet (10 mg total) by mouth nightly.      levothyroxine 25 MCG Oral Tab Take 1 tablet (25  mcg total) by mouth every morning before breakfast.      allopurinol 100 MG Oral Tab Take 1 tablet (100 mg total) by mouth daily.      !! HYDROcodone-acetaminophen 5-325 MG Oral Tab Take 1 tablet by mouth every 8 (eight) hours as needed.      losartan 100 MG Oral Tab Take 0.5 tablets (50 mg total) by mouth daily.      !! HYDROcodone-acetaminophen 5-325 MG Oral Tab TAKE 1/2 TO 1 TABLET BY MOUTH DAILY AS NEEDED FOR PAIN      Levobunolol HCl 0.5 % Ophthalmic Solution       Coenzyme Q10 (CO Q-10) 200 MG Oral Cap Take by mouth.      Turmeric (QC TUMERIC COMPLEX OR) Take 400 mg by mouth.      Omega-3 Fatty Acids (OMEGA-3 2100 OR) Take by mouth.      Ginger, Zingiber officinalis, (GINGER OR) Take 450 mg by mouth daily.      Miconazole Nitrate 2 % External Powder Apply topically as needed for Itching.      aspirin 81 MG Oral Tab Take 1 tablet (81 mg total) by mouth daily.      nitroGLYCERIN 0.4 MG Sublingual SL Tab Place 1 tablet (0.4 mg total) under the tongue every 5 (five) minutes as needed for Chest pain.      latanoprost (XALATAN) 0.005 % Ophthalmic Solution One droplet in each eye QHS.        !! - Potential duplicate medications found. Please discuss with provider.              Discharge Diet: As tolerated    Discharge Activity: As tolerated    Follow up:      Follow-up Information       Staci Linder MD Follow up in 1 month(s).    Specialty: NEUROLOGY  Contact information:  [address]  [address]  [facility] [address] [phone]               Zonia Serrato MD. Go in 2 day(s).    Specialty: Internal Medicine  Why: Please see your primary care team for hospital discharge follow up.  Contact information:  [address] [facility] [address]  [phone]                               >30 minutes spent on discharge orders, coordination, and planning.         Es Cabello MD  9/2/2024

## 2024-09-03 PROCEDURE — 99305 1ST NF CARE MODERATE MDM 35: CPT | Performed by: INTERNAL MEDICINE

## 2024-09-05 ENCOUNTER — EXTERNAL FACILITY (OUTPATIENT)
Dept: INTERNAL MEDICINE CLINIC | Facility: CLINIC | Age: 89
End: 2024-09-05

## 2024-09-05 DIAGNOSIS — E03.9 ACQUIRED HYPOTHYROIDISM: ICD-10-CM

## 2024-09-05 DIAGNOSIS — I48.0 PAROXYSMAL ATRIAL FIBRILLATION (HCC): Primary | ICD-10-CM

## 2024-09-05 NOTE — PROGRESS NOTES
follow up       Past Medical History:   ASHD (arteriosclerotic heart disease)   Concussion   from fall   Essential hypertension   Gastric ulcer without hemorrhage or perforation, unspecified chronicity   Glaucoma   Gout   HTN (hypertension)   Hyperlipidemia   OA (osteoarthritis)   Osteopenia   last dexa 2015 Frax score 14% and 3.5% Frax 2017 155% and 4.7%    Past Surgical History:  Procedure Laterality Date   Breast biopsy   Cataract extraction Bilateral   Hysterectomy   Knee arthroscopy Right   Other surgical history Left 2013   L femur fracture with redo   Pt w/ coronary artery stent 2012   bare metal    Social History    Socioeconomic History   Marital status: Single  Tobacco Use   Smoking status: Never   Smokeless tobacco: Never  Vaping Use   Vaping status: Never Used  Substance and Sexual Activity   Alcohol use: Not Currently   Alcohol/week: 2.0 standard drinks of alcohol   Types: 2 Glasses of wine per week   Drug use: No    S  Allergies  Allergen Reactions   Streptomycin OTHER (SEE COMMENTS)   numbness   Amoxicillin   Bee Venom   Penicillins   Vioxx [Rofecoxib]    CURRENT MEDICATIONS - reviewed    REVIEW OF SYSTEMS:  she is confused, no issues per nurse    PHYSICAL EXAM:    SKIN: no rashes, no suspicious lesions  WOUND: wound care to eval  EYES: PERRLA, EOMI, sclera anicteric, conjunctiva normal; there is no nystagmus, no drainage from eyes  HENT: normocephalic; normal nose, no nasal drainage, mucous membranes pink, moist, pharynx no exudate, no visible cerumen.  NECK: supple; FROM; no JVD, no TMG, no carotid bruits  BREAST: deferred  RESPIRATORY:clear to percussion and auscultation  CARDIOVASCULAR: S1, S2 normal, RRR; no S3, no S4;  ABDOMEN: normal active BS+, soft, nondistended; no organomegaly, no masses; no bruits; nontender, no guarding, no rebound tenderness.  :no suprapubic distension  LYMPHATIC:no lymphedema  MUSCULOSKELETAL: generalized weakness  EXTREMITIES/VASCULAR:no cyanosis, clubbing or  edema  NEUROLOGIC: does not follow commands, confused  PSYCHIATRIC: oriented to self, progressive dementia    a/p    PLAN : s/p fall - fall precautions , afib - stable monitor , off ac, dementia - supportive care         1. Fall    -ED workup- Calvarial hematoma, right temporoparietal and left posterior parietal regions, had developed, suggesting subacute swelling or hematoma. MRI brain negative for acute findings .CT brain showed no acute findings  -monitor for fall risk  ptot  2. Weakness generalized  ptot  3. Hypokalemia  -cont potassium chl 20meg po daily  -monitor bmp    4. Acute encephalopathy/ Dementia  - follow up with neurology    5. Hx of chest pain  -nitroglycerin 0.4mg po q 5 min for chest pain prn    6. Eye pressure  -cont levobunolol ophthalmic soln one drop both eyes daily  -cont latanoprost ophth one drop both eyes q hs    7. HL  -cont atorvastatin 20mg po q hs    8.Gout    -cont allopurinol 100mg po daily    9. Hypothyroidism  -cont levothyroxine 25mcg po daily    10. Fungal skin on legs  -cont to apply miconazole 2% powder to affected area  -wound care

## 2024-09-05 NOTE — PROGRESS NOTES
HPI   seen9/3/2024   89-year-old  female with history of chronic atrial fibrillation, hypertension, noncompliant with her medications who was seen by her primary care physician 2 weeks ago and given a prescription of her medications. Her brother picked them up, but he doubts that she took them. Later that week when brother called her she didn't answer and then found her lying on the floor in her house (patient lives alone). Patient was alert, but could not recall for how long she had been on the floor. ED workup done 9/2/24 vitals stable,. CT brain sPatient was admitted for weakness, and possible placement.    Neurology was consulted for encephalopathy, and based on history and exam, it was deemed she had progression of her dementia, likely undiagnosed late onset Alzheimer's disease.  Eliquis for Afib stopped due to falls and concern for compliancy with dementia. Was recommended to continue aricept as outpatient.       Past Medical History:   ASHD (arteriosclerotic heart disease)   Concussion   from fall   Essential hypertension   Gastric ulcer without hemorrhage or perforation, unspecified chronicity   Glaucoma   Gout   HTN (hypertension)   Hyperlipidemia   OA (osteoarthritis)   Osteopenia   last dexa 2015 Frax score 14% and 3.5% Frax 2017 155% and 4.7%    Past Surgical History:  Procedure Laterality Date   Breast biopsy   Cataract extraction Bilateral   Hysterectomy   Knee arthroscopy Right   Other surgical history Left 2013   L femur fracture with redo   Pt w/ coronary artery stent 2012   bare metal    Social History    Socioeconomic History   Marital status: Single  Tobacco Use   Smoking status: Never   Smokeless tobacco: Never  Vaping Use   Vaping status: Never Used  Substance and Sexual Activity   Alcohol use: Not Currently   Alcohol/week: 2.0 standard drinks of alcohol   Types: 2 Glasses of wine per week   Drug use: No    S  Allergies  Allergen Reactions   Streptomycin OTHER (SEE COMMENTS)    numbness   Amoxicillin   Bee Venom   Penicillins   Vioxx [Rofecoxib]        CURRENT MEDICATIONS - reviewed         REVIEW OF SYSTEMS:  she is  confused     PHYSICAL EXAM:    SKIN: no rashes, no suspicious lesions  WOUND: wound care to eval  EYES: PERRLA, EOMI, sclera anicteric, conjunctiva normal; there is no nystagmus, no drainage from eyes  HENT: normocephalic; normal nose, no nasal drainage, mucous membranes pink, moist, pharynx no exudate, no visible cerumen.  NECK: supple; FROM; no JVD, no TMG, no carotid bruits  BREAST: deferred  RESPIRATORY:clear to percussion and auscultation  CARDIOVASCULAR: S1, S2 normal, RRR; no S3, no S4;  ABDOMEN: normal active BS+, soft, nondistended; no organomegaly, no masses; no bruits; nontender, no guarding, no rebound tenderness.  :no suprapubic distension  LYMPHATIC:no lymphedema  MUSCULOSKELETAL: generalized weakness  EXTREMITIES/VASCULAR:no cyanosis, clubbing or edema  NEUROLOGIC: does not follow commands, confused  PSYCHIATRIC: oriented to self, progressive dementia    a/p     1. Fall    -ED workup- Calvarial hematoma, right temporoparietal and left posterior parietal regions, had developed, suggesting subacute swelling or hematoma. MRI brain negative for acute findings .CT brain showed no acute findings  -monitor for fall risk  ptot  2. Weakness generalized  ptot   3. Hypokalemia  -cont potassium chl 20meg po daily  -monitor bmp    4. Acute encephalopathy/ Dementia  - follow up with neurology     5. Hx of chest pain  -nitroglycerin 0.4mg po q 5 min for chest pain prn    6. Eye pressure  -cont levobunolol ophthalmic soln one drop both eyes daily  -cont latanoprost ophth one drop both eyes q hs    7. HL  -cont atorvastatin 20mg po q hs    8.Gout    -cont allopurinol 100mg po daily    9. Hypothyroidism  -cont levothyroxine 25mcg po daily    10. Fungal skin on legs  -cont to apply miconazole 2% powder to affected area  -wound care

## 2024-09-10 ENCOUNTER — HOSPITAL ENCOUNTER (EMERGENCY)
Facility: HOSPITAL | Age: 89
Discharge: HOME | End: 2024-09-10
Attending: EMERGENCY MEDICINE
Payer: MEDICARE

## 2024-09-10 ENCOUNTER — APPOINTMENT (OUTPATIENT)
Dept: CT IMAGING | Facility: HOSPITAL | Age: 89
End: 2024-09-10
Attending: EMERGENCY MEDICINE
Payer: MEDICARE

## 2024-09-10 ENCOUNTER — APPOINTMENT (OUTPATIENT)
Dept: GENERAL RADIOLOGY | Facility: HOSPITAL | Age: 89
End: 2024-09-10
Attending: EMERGENCY MEDICINE
Payer: MEDICARE

## 2024-09-10 VITALS
BODY MASS INDEX: 21.66 KG/M2 | TEMPERATURE: 97 F | SYSTOLIC BLOOD PRESSURE: 115 MMHG | WEIGHT: 130 LBS | DIASTOLIC BLOOD PRESSURE: 65 MMHG | RESPIRATION RATE: 16 BRPM | HEART RATE: 79 BPM | OXYGEN SATURATION: 98 % | HEIGHT: 65 IN

## 2024-09-10 DIAGNOSIS — R40.4 TRANSIENT ALTERATION OF AWARENESS: Primary | ICD-10-CM

## 2024-09-10 LAB
ALBUMIN SERPL-MCNC: 3.5 G/DL (ref 3.2–4.8)
ALBUMIN/GLOB SERPL: 1.3 {RATIO} (ref 1–2)
ALP LIVER SERPL-CCNC: 71 U/L
ALT SERPL-CCNC: <7 U/L
ANION GAP SERPL CALC-SCNC: 12 MMOL/L (ref 0–18)
AST SERPL-CCNC: 29 U/L (ref ?–34)
BASOPHILS # BLD AUTO: 0.03 X10(3) UL (ref 0–0.2)
BASOPHILS NFR BLD AUTO: 0.3 %
BILIRUB SERPL-MCNC: 0.7 MG/DL (ref 0.2–1.1)
BUN BLD-MCNC: 43 MG/DL (ref 9–23)
BUN/CREAT SERPL: 33.6 (ref 10–20)
CALCIUM BLD-MCNC: 10.2 MG/DL (ref 8.7–10.4)
CHLORIDE SERPL-SCNC: 105 MMOL/L (ref 98–112)
CO2 SERPL-SCNC: 24 MMOL/L (ref 21–32)
CREAT BLD-MCNC: 1.28 MG/DL
DEPRECATED RDW RBC AUTO: 47.5 FL (ref 35.1–46.3)
EGFRCR SERPLBLD CKD-EPI 2021: 40 ML/MIN/1.73M2 (ref 60–?)
EOSINOPHIL # BLD AUTO: 0.05 X10(3) UL (ref 0–0.7)
EOSINOPHIL NFR BLD AUTO: 0.5 %
ERYTHROCYTE [DISTWIDTH] IN BLOOD BY AUTOMATED COUNT: 15 % (ref 11–15)
GLOBULIN PLAS-MCNC: 2.8 G/DL (ref 2–3.5)
GLUCOSE BLD-MCNC: 107 MG/DL (ref 70–99)
GLUCOSE BLDC GLUCOMTR-MCNC: 100 MG/DL (ref 70–99)
HCT VFR BLD AUTO: 41.6 %
HGB BLD-MCNC: 13.8 G/DL
IMM GRANULOCYTES # BLD AUTO: 0.05 X10(3) UL (ref 0–1)
IMM GRANULOCYTES NFR BLD: 0.5 %
LYMPHOCYTES # BLD AUTO: 1.67 X10(3) UL (ref 1–4)
LYMPHOCYTES NFR BLD AUTO: 17.9 %
MCH RBC QN AUTO: 28.6 PG (ref 26–34)
MCHC RBC AUTO-ENTMCNC: 33.2 G/DL (ref 31–37)
MCV RBC AUTO: 86.1 FL
MONOCYTES # BLD AUTO: 0.55 X10(3) UL (ref 0.1–1)
MONOCYTES NFR BLD AUTO: 5.9 %
NEUTROPHILS # BLD AUTO: 6.98 X10 (3) UL (ref 1.5–7.7)
NEUTROPHILS # BLD AUTO: 6.98 X10(3) UL (ref 1.5–7.7)
NEUTROPHILS NFR BLD AUTO: 74.9 %
OSMOLALITY SERPL CALC.SUM OF ELEC: 303 MOSM/KG (ref 275–295)
PLATELET # BLD AUTO: 304 10(3)UL (ref 150–450)
POTASSIUM SERPL-SCNC: 3.3 MMOL/L (ref 3.5–5.1)
PROT SERPL-MCNC: 6.3 G/DL (ref 5.7–8.2)
Q-T INTERVAL: 352 MS
QRS DURATION: 90 MS
QTC CALCULATION (BEZET): 458 MS
R AXIS: 20 DEGREES
RBC # BLD AUTO: 4.83 X10(6)UL
SODIUM SERPL-SCNC: 141 MMOL/L (ref 136–145)
T AXIS: 70 DEGREES
TROPONIN I SERPL HS-MCNC: 22 NG/L
VENTRICULAR RATE: 102 BPM
WBC # BLD AUTO: 9.3 X10(3) UL (ref 4–11)

## 2024-09-10 PROCEDURE — 70450 CT HEAD/BRAIN W/O DYE: CPT | Performed by: EMERGENCY MEDICINE

## 2024-09-10 PROCEDURE — 96361 HYDRATE IV INFUSION ADD-ON: CPT

## 2024-09-10 PROCEDURE — 96360 HYDRATION IV INFUSION INIT: CPT

## 2024-09-10 PROCEDURE — 82962 GLUCOSE BLOOD TEST: CPT

## 2024-09-10 PROCEDURE — 99308 SBSQ NF CARE LOW MDM 20: CPT | Performed by: INTERNAL MEDICINE

## 2024-09-10 PROCEDURE — 71045 X-RAY EXAM CHEST 1 VIEW: CPT | Performed by: EMERGENCY MEDICINE

## 2024-09-10 PROCEDURE — 85025 COMPLETE CBC W/AUTO DIFF WBC: CPT | Performed by: EMERGENCY MEDICINE

## 2024-09-10 PROCEDURE — 99285 EMERGENCY DEPT VISIT HI MDM: CPT

## 2024-09-10 PROCEDURE — 80053 COMPREHEN METABOLIC PANEL: CPT | Performed by: EMERGENCY MEDICINE

## 2024-09-10 PROCEDURE — 93010 ELECTROCARDIOGRAM REPORT: CPT

## 2024-09-10 PROCEDURE — 84484 ASSAY OF TROPONIN QUANT: CPT | Performed by: EMERGENCY MEDICINE

## 2024-09-10 PROCEDURE — 93005 ELECTROCARDIOGRAM TRACING: CPT

## 2024-09-10 NOTE — ED PROVIDER NOTES
Patient Seen in: [facility] Emergency Department    History     Chief Complaint   Patient presents with    Altered Mental Status       HPI    History is provided by patient/independent historian: patient, EMS  89 year old female with with history of Alzheimer's, hypertension, hyperlipidemia, brought in by EMS after an episode of unresponsiveness for 4 minutes after physical therapy.  Per EMS, they report patient participated in physical therapy, and then they could not wake her up.  They received a call for a full arrest and found patient on a nonrebreather with a pulse arousable to voice.  Patient arrives without complaints and would like to be left alone.    History reviewed.   Past Medical History:    ASHD (arteriosclerotic heart disease)    Concussion    from fall    Essential hypertension    Gastric ulcer without hemorrhage or perforation, unspecified chronicity    Glaucoma    Gout    HTN (hypertension)    Hyperlipidemia    OA (osteoarthritis)    Osteopenia    last dexa 2015 Frax score 14% and 3.5%  Frax 2017 155% and 4.7%         History reviewed.   Past Surgical History:   Procedure Laterality Date    Breast biopsy      Cataract extraction Bilateral     Hysterectomy      Knee arthroscopy Right     Other surgical history Left 2013    L femur fracture with redo    Pt w/ coronary artery stent  2012    bare metal         Home Medications reviewed :  (Not in a hospital admission)        History reviewed.   Social History     Socioeconomic History    Marital status: Single   Tobacco Use    Smoking status: Never    Smokeless tobacco: Never   Vaping Use    Vaping status: Never Used   Substance and Sexual Activity    Alcohol use: Not Currently     Alcohol/week: 2.0 standard drinks of alcohol     Types: 2 Glasses of wine per week    Drug use: No         ROS  Review of Systems   Unable to perform ROS: Dementia      All other pertinent organ systems are reviewed and are negative.      Physical Exam     ED Triage  Vitals [09/10/24 1233]   /81   Pulse 99   Resp 17   Temp 97.2 °F (36.2 °C)   Temp src Temporal   SpO2 98 %   O2 Device None (Room air)     Vital signs reviewed.      Physical Exam  Vitals and nursing note reviewed.   Constitutional:       Comments: Arousable to voice, hard of hearing, intermittently agitated   HENT:      Head: Normocephalic.   Eyes:      Extraocular Movements: Extraocular movements intact.   Cardiovascular:      Rate and Rhythm: Normal rate.   Pulmonary:      Breath sounds: Normal breath sounds.   Abdominal:      Tenderness: There is no abdominal tenderness.   Musculoskeletal:         General: No deformity.   Skin:     General: Skin is warm.         ED Course       Labs:     Labs Reviewed   CBC WITH DIFFERENTIAL WITH PLATELET - Abnormal; Notable for the following components:       Result Value    RDW-SD 47.5 (*)     All other components within normal limits   COMP METABOLIC PANEL (14) - Abnormal; Notable for the following components:    Glucose 107 (*)     Potassium 3.3 (*)     BUN 43 (*)     Creatinine 1.28 (*)     BUN/CREA Ratio 33.6 (*)     Calculated Osmolality 303 (*)     eGFR-Cr 40 (*)     ALT <7 (*)     All other components within normal limits   POCT GLUCOSE - Abnormal; Notable for the following components:    POC Glucose  100 (*)     All other components within normal limits   TROPONIN I HIGH SENSITIVITY - Normal   RAINBOW DRAW BLUE   RAINBOW DRAW GOLD         My EKG Interpretation: EKG    Rate, intervals and axes as noted on EKG Report.  Rate: 102  Rhythm: Atrial Fibrillation  Reading: abnormal for rate, abnormal for rhythm, no acute ST changes           As reviewed and Interpreted by me      Imaging Results Available and Reviewed while in ED:   XR CHEST AP PORTABLE  (CPT=71045)    Result Date: 9/10/2024  CONCLUSION:  1. No acute disease in the chest.    Dictated by (CST): Benito Diaz MD on 9/10/2024 at 2:01 PM     Finalized by (CST): Benito Diaz MD on 9/10/2024 at 2:03 PM           CT BRAIN OR HEAD (CPT=70450)    Result Date: 9/10/2024  CONCLUSION:  1. No acute intracranial process by noncontrast CT technique. 2. Intracranial atherosclerosis. 3. Nonspecific near complete opacification of the left mastoid and middle ear cavities. 4. Morphologic changes of low-grade chronic right maxillary sinusitis.    Dictated by (CST): Salomon Power MD on 9/10/2024 at 1:59 PM     Finalized by (CST): Salomon Power MD on 9/10/2024 at 2:02 PM         My review and independent interpretation of CT images: no ICH. Radiology report corroborates this in addition to other details as reported by them.      Decision rules/scores evaluated: none      Diagnostic labs/tests considered but not ordered: none    ED Medications Administered:   Medications   sodium chloride 0.9 % IV bolus 1,000 mL (1,000 mL Intravenous New Bag 9/10/24 1236)                MDM       Medical Decision Making      Differential Diagnosis: After obtaining the patient's history, performing the physical exam and reviewing the diagnostics, multiple initial diagnoses were considered based on the presenting problem including ACS, arrhythmia, hypoglycemia, ICH, UTI    External document review: I personally reviewed available external medical records for any recent pertinent discharge summaries, testing, and procedures - the findings are as follows: 8/29/24 admission for fall    Complicating Factors: The patient already  has a past medical history of ASHD (arteriosclerotic heart disease), Concussion (09482064), Essential hypertension, Gastric ulcer without hemorrhage or perforation, unspecified chronicity (7/20/2017), Glaucoma, Gout (6/19/2014), HTN (hypertension), Hyperlipidemia, OA (osteoarthritis), and Osteopenia. to contribute to the complexity of this ED evaluation.    Procedures performed: none    Discussed management with physician/appropriate source: none    Considered admission/deescalation of care for: AMS    Social determinants of health  affecting patient care: none    Prescription medications considered: discussed continuing current medication regimen    The patient requires continuous monitoring for: unresponsive episode    Shared decision making: discussed possible admission      Disposition and Plan     Clinical Impression:  1. Transient alteration of awareness        Disposition:  Discharge    Follow-up:  Zonia Serrato MD  [address] [place] [id number]  [phone]    Follow up        Medications Prescribed:  Current Discharge Medication List

## 2024-09-10 NOTE — ED INITIAL ASSESSMENT (HPI)
Patient to ED via EMS from [facility] after a brief episode of unresponsiveness for approx. 4 minutes after physical therapy. Per EMS, facility unable to give detailed story. Patient reportedly never stopped breathing or lost pulse.  Patient awake, talking word salad. Told ER staff, \"shut up!\" A/Ox1 per baseline. Hx afib. Patient denies pain upon arrival. No acute signs of distress.

## 2024-09-11 ENCOUNTER — SNF VISIT (OUTPATIENT)
Dept: INTERNAL MEDICINE CLINIC | Facility: SKILLED NURSING FACILITY | Age: 89
End: 2024-09-11

## 2024-09-11 VITALS
DIASTOLIC BLOOD PRESSURE: 65 MMHG | HEART RATE: 73 BPM | RESPIRATION RATE: 18 BRPM | BODY MASS INDEX: 20 KG/M2 | OXYGEN SATURATION: 97 % | SYSTOLIC BLOOD PRESSURE: 142 MMHG | TEMPERATURE: 98 F | WEIGHT: 120.31 LBS

## 2024-09-11 DIAGNOSIS — G93.40 ACUTE ENCEPHALOPATHY: ICD-10-CM

## 2024-09-11 DIAGNOSIS — Z75.8 DISCHARGE PLANNING ISSUES: Primary | ICD-10-CM
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
## 2024-09-11 DIAGNOSIS — R40.4 TRANSIENT ALTERATION OF AWARENESS: ICD-10-CM

## 2024-09-11 DIAGNOSIS — E63.9 POOR NUTRITION: ICD-10-CM

## 2024-09-11 DIAGNOSIS — E87.6 HYPOKALEMIA: ICD-10-CM

## 2024-09-11 DIAGNOSIS — R53.1 GENERALIZED WEAKNESS: ICD-10-CM

## 2024-09-11 PROCEDURE — 99310 SBSQ NF CARE HIGH MDM 45: CPT | Performed by: NURSE PRACTITIONER

## 2024-09-11 NOTE — PROGRESS NOTES
Karen Finney  : 1935  Age 89 year old  female patient is admitted to [facility] 24 for rehab and strengthening      Chief complaint: Acute encephalopathy , progressive dementia, sent to [facility] ED 9/10/24 for episode of unresponsiveness in therapy gym       HPI   89-year-old  female with history of chronic atrial fibrillation, hypertension, noncompliant with her medications who was seen by her primary care physician 2 weeks ago and given a prescription of her medications. Her brother picked them up, but he doubts that she took them. Later that week when brother called her she didn't answer and then found her lying on the floor in her house (patient lives alone). Patient was alert, but could not recall for how long she had been on the floor. ED workup done 24  vitals stable,. CT brain showed no acute findings. There was soft tissue subcutaneous swelling. Calvarial hematoma, right temporoparietal and left posterior parietal regions, had developed, suggesting subacute swelling or hematoma. MRI brain negative for acute findings. . EKG showed atrial fibrillation, rate between 90 and 110, chronic history. Patient was admitted for weakness, and possible placement.   Neurology was consulted for encephalopathy, and based on history and exam, it was deemed she had progression of her dementia, likely undiagnosed late onset Alzheimer's disease. During hospitalization she was seen hold and talking to stuffed puppy animal as if it was a real pet. She appeared disheveled and AxO1-2. Discussed with brother that patient unsafe to live alone, and would  benefit from outpatient palliative consult/evaluation to discuss goals of care, advance care planning. Brother was only known family. Brother expressed understanding.  provided list of Assisted living facilities for him to look into. On discharge she was transferred to acute rehab. Eliquis for Afib stopped due to falls and  concern for compliancy with dementia. Was recommended to continue aricept as outpatient. Outpatient neurology appointment provided.      Patient was seen in follow up . VSS, No reported fevers or chills. Patient lying with her stuffed dog, confused and oriented  to self. She  reports just wants to be left alone . Unable to determine if participated in therapy  today, yesterday had an episode in therapy of unresponsiveness and was sent to - work up doen of EKG ( afib) , cxr ( neg) , ct of brain ( neg) and labs.  Was diagnosed as transient alteration of awareness.  She was then sent back to rehab .  Being  feed by staff, is a  one on one feed.  RN reports she refuses meds , meals and liquids at times .  Was seen by Dietician today due to malnutrition but pt eats very poorly. Patient denies pain or discomfort, unclear why she is here, does know it is not her home.  Lungs clear, HR regular. Per RN no there new issues or concerns.  SW involved concerning dc plan . Will most likely need family  careplan and discussion of LTC/MEMORY CARE .  No other new issues or concerns per staff.       ALLERGIES:  Allergies   Allergen Reactions    Streptomycin OTHER (SEE COMMENTS)     numbness    Amoxicillin     Bee Venom     Penicillins     Vioxx [Rofecoxib]        CODE STATUS:  Full Code    ADVANCED CARE PLANNING TEAM: will need family care plan, SW involved, will need LTC /memory care     CURRENT MEDICATIONS - reviewed and updated     Current Outpatient Medications   Medication Sig Dispense Refill    donepezil 10 MG Oral Tab Take 0.5 tablets (5 mg total) by mouth every morning for 30 days, THEN 1 tablet (10 mg total) every morning. 105 tablet 0    Potassium Chloride ER 20 MEQ Oral Tab CR Take 20 mEq by mouth daily. 2 tablet 0    ergocalciferol 1.25 MG (76616 UT) Oral Cap Take 1 capsule (50,000 Units total) by mouth once a week. 12 capsule 0    rosuvastatin 10 MG Oral Tab Take 1 tablet (10 mg total) by mouth nightly. 90 tablet 1     levothyroxine 25 MCG Oral Tab Take 1 tablet (25 mcg total) by mouth every morning before breakfast. 90 tablet 3    allopurinol 100 MG Oral Tab Take 1 tablet (100 mg total) by mouth daily. 90 tablet 3    HYDROcodone-acetaminophen 5-325 MG Oral Tab Take 1 tablet by mouth every 8 (eight) hours as needed. 15 tablet 0    losartan 100 MG Oral Tab Take 0.5 tablets (50 mg total) by mouth daily. 90 tablet 1    HYDROcodone-acetaminophen 5-325 MG Oral Tab TAKE 1/2 TO 1 TABLET BY MOUTH DAILY AS NEEDED FOR PAIN 30 tablet 0    Levobunolol HCl 0.5 % Ophthalmic Solution       Coenzyme Q10 (CO Q-10) 200 MG Oral Cap Take by mouth.      Turmeric (QC TUMERIC COMPLEX OR) Take 400 mg by mouth.      Omega-3 Fatty Acids (OMEGA-3 2100 OR) Take by mouth.      Ginger, Zingiber officinalis, (GINGER OR) Take 450 mg by mouth daily.      Miconazole Nitrate 2 % External Powder Apply topically as needed for Itching.      aspirin 81 MG Oral Tab Take 1 tablet (81 mg total) by mouth daily. 30 tablet 11    nitroGLYCERIN 0.4 MG Sublingual SL Tab Place 1 tablet (0.4 mg total) under the tongue every 5 (five) minutes as needed for Chest pain. 20 tablet 1    latanoprost (XALATAN) 0.005 % Ophthalmic Solution One droplet in each eye QHS.          VITALS:  /65   Pulse 73   Temp 97.7 °F (36.5 °C)   Resp 18   Wt 120 lb 4.8 oz (54.6 kg)   SpO2 97%   BMI 20.02 kg/m²       REVIEW OF SYSTEMS:  Difficult to do an ROS, Patient very confused at baseline and oriented to self. Is talking about her dog which she is holding a stuffed animal dog. She is a  one on one feed, but eats and drinks poorly and refuses meds much of the time . Unclear if she participated in therapy today due to episode in therapy yesterday which resulted in an ED VISIT . Currently VSS.  Staff reports no other new issues or concerns at this time.        PHYSICAL EXAM:  GENERAL HEALTH: 88 y/o confused female, lying in bed, talking about her dog ( stuffed animal)   LINES, TUBES,  DRAINS:  none  SKIN: no rashes, no suspicious lesions  WOUND: wound care following    EYES: PERRLA, EOMI, sclera anicteric, conjunctiva normal; there is no nystagmus, no drainage from eyes  HENT: normocephalic; normal nose, no nasal drainage, mucous membranes pink, moist, pharynx no exudate, no visible cerumen.  NECK: supple; FROM; no JVD, no TMG, no carotid bruits  BREAST: deferred  RESPIRATORY:clear to percussion and auscultation  CARDIOVASCULAR: S1, S2 normal, RRR; no S3, no S4;   ABDOMEN:  normal active BS+, soft, nondistended; no organomegaly, no masses; no bruits; nontender, no guarding, no rebound tenderness.  :no suprapubic distension  LYMPHATIC:no lymphedema  MUSCULOSKELETAL: generalized weakness   EXTREMITIES/VASCULAR:no cyanosis, clubbing or edema  NEUROLOGIC: does not follow commands, confused   PSYCHIATRIC: oriented to self, progressive dementia      SEE PLAN BELOW  Episode of unresponsiveness in the gym  9/10/24 and 911 called   -patient was unresponsive but was breathing and had a pulse  - code called   -IV started , after pt was placed on cart in therapy room, placed on a re -breather mask   -vs stable   -patient awoke when 911 got there   -patient went to [facility] ED for eval   -labs done, cxr done, EKG done showed afib and ct of head done - all neg except for EKG with afib ( does have hx).   -Diagnosed with transient alteration of consciousness   -patient sent back to Rehab   -sw meeting with family to determine dc plan          1. Fall  -recent fall   -lives alone , found on floor at her home   -ED workup- Calvarial hematoma, right temporoparietal and left posterior parietal regions, had developed, suggesting subacute swelling or hematoma. MRI brain negative for acute findings .CT brain showed no acute findings   -monitor for fall risk   -PT/OT/SPEECH   -Monitor          2.  Weakness generalized  -unclear if participated in therapy eval  -patient needs SW and assistance with placement to LTC/memory  care, will need care plan asap -cont vitamins - bala oil 450mg po q day , cOq 10 200 mg po daily  , Tumeric 400mg po daily   -poor nutrition - dietician following  pt and provide nutritional  supplements  (  is a one on one feed)   -monitor        3. Hypokalemia  -cont potassium chl 20meg po daily   -weekly cbc and bmp in rehab      4.  Acute encephalopathy/  Dementia  -confused  -appears to have progressive dementia and advanced - very confused  -needs one on one feed, discussed with RN   -on aricept 5mg po daily   -unsafe to live alone at home , sw involved, needs placement   -monitor      5. Hx of chest pain  -nitroglycerin 0.4mg po q 5 min for chest pain prn      6. Eye pressure   -cont levobunolol ophthalmic soln one drop both eyes daily   -cont latanoprost ophth one drop both eyes q hs      7. HL  -cont atorvastatin 20mg po q hs      8.Gout  -present no symptoms  -cont allopurinol 100mg po daily      9. Hypothyroidism   -cont levothyroxine 25mcg po daily      10. Fungal skin on legs  -cont to apply miconazole 2% powder to affected area  -wound rn to follow in rehab     11. Dvt proph /afib  -asa 81mg po daily   -had to stop eliquis due to falls per hospital records      12. Pain  -norco 5/325mg po q 8 prn   -added tylenol 650mg po q 6 prn      13. Discharge plan complicated  -will need another family care plan   -LTC/MEMORY CARE recommended         This is a 35 minute visit and greater than 50% of the time was spent counseling the patient and/or coordinating care.    Kenzie Salinas, APRN  09/11/24   1:54 PM

## 2024-09-12 ENCOUNTER — EXTERNAL FACILITY (OUTPATIENT)
Dept: INTERNAL MEDICINE CLINIC | Facility: CLINIC | Age: 89
End: 2024-09-12

## 2024-09-12 DIAGNOSIS — F01.50 VASCULAR DEMENTIA WITHOUT BEHAVIORAL DISTURBANCE, PSYCHOTIC DISTURBANCE, MOOD DISTURBANCE, OR ANXIETY, UNSPECIFIED DEMENTIA SEVERITY (HCC): ICD-10-CM

## 2024-09-12 DIAGNOSIS — I10 PRIMARY HYPERTENSION: ICD-10-CM

## 2024-09-12 DIAGNOSIS — I48.0 PAROXYSMAL ATRIAL FIBRILLATION (HCC): Primary | ICD-10-CM

## 2024-09-12 NOTE — PROGRESS NOTES
follow up from er   she was send out due to unresponsive episode during pt ,  Past Medical History:   ASHD (arteriosclerotic heart disease)   Concussion   from fall   Essential hypertension   Gastric ulcer without hemorrhage or perforation, unspecified chronicity   Glaucoma   Gout   HTN (hypertension)   Hyperlipidemia   OA (osteoarthritis)   Osteopenia   last dexa 2015 Frax score 14% and 3.5% Frax 2017 155% and 4.7%    Past Surgical History:  Procedure Laterality Date   Breast biopsy   Cataract extraction Bilateral   Hysterectomy   Knee arthroscopy Right   Other surgical history Left 2013   L femur fracture with redo   Pt w/ coronary artery stent 2012   bare metal    Social History    Socioeconomic History   Marital status: Single  Tobacco Use   Smoking status: Never   Smokeless tobacco: Never  Vaping Use   Vaping status: Never Used  Substance and Sexual Activity   Alcohol use: Not Currently   Alcohol/week: 2.0 standard drinks of alcohol   Types: 2 Glasses of wine per week   Drug use: No    S  Allergies  Allergen Reactions   Streptomycin OTHER (SEE COMMENTS)   numbness   Amoxicillin   Bee Venom   Penicillins   Vioxx [Rofecoxib]    CURRENT MEDICATIONS - reviewed    REVIEW OF SYSTEMS:  at her baseline , she has no complains    PHYSICAL EXAM:    SKIN: no rashes, no suspicious lesions  WOUND: wound care to eval  EYES: PERRLA, EOMI, sclera anicteric, conjunctiva normal; there is no nystagmus, no drainage from eyes  HENT: normocephalic; normal nose, no nasal drainage, mucous membranes pink, moist, pharynx no exudate, no visible cerumen.  NECK: supple; FROM; no JVD, no TMG, no carotid bruits  BREAST: deferred  RESPIRATORY:clear to percussion and auscultation  CARDIOVASCULAR: S1, S2 normal, RRR; no S3, no S4;  ABDOMEN: normal active BS+, soft, nondistended; no organomegaly, no masses; no bruits; nontender, no guarding, no rebound tenderness.  :no suprapubic distension  LYMPHATIC:no lymphedema  MUSCULOSKELETAL: generalized  weakness  EXTREMITIES/VASCULAR:no cyanosis, clubbing or edema  NEUROLOGIC: does not follow commands, confused  PSYCHIATRIC: oriented to self, progressive dementia    a/p    PLAN : unresponsive episode / er workup negative   dementia - supportive care, afib- continue meds   ptot    1. Fall    -ED workup- Calvarial hematoma, right temporoparietal and left posterior parietal regions, had developed, suggesting subacute swelling or hematoma. MRI brain negative for acute findings .CT brain showed no acute findings  -monitor for fall risk  ptot  2. Weakness generalized  ptot  3. Hypokalemia  -cont potassium chl 20meg po daily  -monitor bmp    4. Acute encephalopathy/ Dementia  - follow up with neurology    5. Hx of chest pain  -nitroglycerin 0.4mg po q 5 min for chest pain prn    6. Eye pressure  -cont levobunolol ophthalmic soln one drop both eyes daily  -cont latanoprost ophth one drop both eyes q hs    7. HL  -cont atorvastatin 20mg po q hs    8.Gout    -cont allopurinol 100mg po daily    9. Hypothyroidism  -cont levothyroxine 25mcg po daily    10. Fungal skin on legs  -cont to apply miconazole 2% powder to affected area  -wound care

## 2024-09-12 NOTE — PROGRESS NOTES
follow up  seen 9/10/2024  Past Medical History:   ASHD (arteriosclerotic heart disease)   Concussion   from fall   Essential hypertension   Gastric ulcer without hemorrhage or perforation, unspecified chronicity   Glaucoma   Gout   HTN (hypertension)   Hyperlipidemia   OA (osteoarthritis)   Osteopenia   last dexa 2015 Frax score 14% and 3.5% Frax 2017 155% and 4.7%    Past Surgical History:  Procedure Laterality Date   Breast biopsy   Cataract extraction Bilateral   Hysterectomy   Knee arthroscopy Right   Other surgical history Left 2013   L femur fracture with redo   Pt w/ coronary artery stent 2012   bare metal    Social History    Socioeconomic History   Marital status: Single  Tobacco Use   Smoking status: Never   Smokeless tobacco: Never  Vaping Use   Vaping status: Never Used  Substance and Sexual Activity   Alcohol use: Not Currently   Alcohol/week: 2.0 standard drinks of alcohol   Types: 2 Glasses of wine per week   Drug use: No    S  Allergies  Allergen Reactions   Streptomycin OTHER (SEE COMMENTS)   numbness   Amoxicillin   Bee Venom   Penicillins   Vioxx [Rofecoxib]    CURRENT MEDICATIONS - reviewed    REVIEW OF SYSTEMS:  at her baseline , she has no complains    PHYSICAL EXAM:    SKIN: no rashes, no suspicious lesions  WOUND: wound care to eval  EYES: PERRLA, EOMI, sclera anicteric, conjunctiva normal; there is no nystagmus, no drainage from eyes  HENT: normocephalic; normal nose, no nasal drainage, mucous membranes pink, moist, pharynx no exudate, no visible cerumen.  NECK: supple; FROM; no JVD, no TMG, no carotid bruits  BREAST: deferred  RESPIRATORY:clear to percussion and auscultation  CARDIOVASCULAR: S1, S2 normal, RRR; no S3, no S4;  ABDOMEN: normal active BS+, soft, nondistended; no organomegaly, no masses; no bruits; nontender, no guarding, no rebound tenderness.  :no suprapubic distension  LYMPHATIC:no lymphedema  MUSCULOSKELETAL: generalized weakness  EXTREMITIES/VASCULAR:no cyanosis,  clubbing or edema  NEUROLOGIC: does not follow commands, confused  PSYCHIATRIC: oriented to self, progressive dementia    a/p    PLAN :  dementia- continue with supportive care, fall precautions, afib- stable , continue with meds, continue with ptot wound care    1. Fall    -ED workup- Calvarial hematoma, right temporoparietal and left posterior parietal regions, had developed, suggesting subacute swelling or hematoma. MRI brain negative for acute findings .CT brain showed no acute findings  -monitor for fall risk  ptot  2. Weakness generalized  ptot  3. Hypokalemia  -cont potassium chl 20meg po daily  -monitor bmp    4. Acute encephalopathy/ Dementia  - follow up with neurology    5. Hx of chest pain  -nitroglycerin 0.4mg po q 5 min for chest pain prn    6. Eye pressure  -cont levobunolol ophthalmic soln one drop both eyes daily  -cont latanoprost ophth one drop both eyes q hs    7. HL  -cont atorvastatin 20mg po q hs    8.Gout    -cont allopurinol 100mg po daily    9. Hypothyroidism  -cont levothyroxine 25mcg po daily    10. Fungal skin on legs  -cont to apply miconazole 2% powder to affected area  -wound care

## 2024-09-13 ENCOUNTER — SNF VISIT (OUTPATIENT)
Dept: INTERNAL MEDICINE CLINIC | Facility: SKILLED NURSING FACILITY | Age: 89
End: 2024-09-13

## 2024-09-13 DIAGNOSIS — E87.6 HYPOKALEMIA: ICD-10-CM

## 2024-09-13 DIAGNOSIS — E03.9 HYPOTHYROIDISM, UNSPECIFIED TYPE: Primary | ICD-10-CM
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
## 2024-09-13 DIAGNOSIS — K21.9 GASTROESOPHAGEAL REFLUX DISEASE WITHOUT ESOPHAGITIS: ICD-10-CM

## 2024-09-13 DIAGNOSIS — F03.90 DEMENTIA, UNSPECIFIED DEMENTIA SEVERITY, UNSPECIFIED DEMENTIA TYPE, UNSPECIFIED WHETHER BEHAVIORAL, PSYCHOTIC, OR MOOD DISTURBANCE OR ANXIETY (HCC): ICD-10-CM

## 2024-09-13 DIAGNOSIS — G93.40 ACUTE ENCEPHALOPATHY: ICD-10-CM

## 2024-09-13 PROCEDURE — 99310 SBSQ NF CARE HIGH MDM 45: CPT | Performed by: NURSE PRACTITIONER

## 2024-09-13 NOTE — PROGRESS NOTES
Karen Finney  : 1935  Age 89 year old  female patient is admitted to [facility] for rehabilitation and strengthening       Chief complaint: Acute encephalopathy , progressive dementia, sent to [facility] ED 9/10/24 for episode of unresponsiveness in therapy gym       HPI   89-year-old  female with history of chronic atrial fibrillation, hypertension, noncompliant with her medications who was seen by her primary care physician 2 weeks ago and given a prescription of her medications. Last week when brother called her she didn't answer and then found her lying on the floor in her house. Patient was alert, but could not recall for how long she had been on the floor. ED workup done 24  vitals stable,. CT brain showed no acute findings. There was soft tissue subcutaneous swelling. Calvarial hematoma, right temporoparietal and left posterior parietal regions, had developed, suggesting subacute swelling or hematoma. MRI brain negative for acute findings.EKG showed atrial fibrillation, rate between 90 and 110, chronic history. Neurology was consulted for encephalopathy, and based on history and exam, it was deemed she had progression of her dementia, likely undiagnosed late onset Alzheimer's disease. During hospitalization she was seen hold and talking to stuffed puppy animal as if it was a real pet. She appeared disheveled and AxO1-2.Eliquis for Afib stopped due to falls and concern for compliancy with dementia. Outpatient neurology appointment provided. She was stabilized and sent to [facility] for rehabilitation    Patient seen in follow up, she became unresponsive during therapy on . Sent to [facility] CT head and chest xray with labs are unremarkable. She was sent back to [facility]. Since then she has been arousable to voice but does sleep a lot. Very pleasant but confused. Brother was at bedside. Therapy to work with her today. VSS. No shortness of breath or chest  pain. No nausea or vomiting.       ALLERGIES:  Allergies   Allergen Reactions    Streptomycin OTHER (SEE COMMENTS)     numbness    Amoxicillin     Bee Venom     Penicillins     Vioxx [Rofecoxib]        IMMUNIZATIONS  Immunization History   Administered Date(s) Administered    Covid-19 Vaccine Pfizer 30 mcg/0.3 ml 03/05/2021, 03/26/2021, 10/27/2021    FLU VAC High Dose 65 YRS & Older PRSV Free (00239) 11/24/2015, 11/07/2017, 12/12/2018, 12/17/2019, 09/02/2020, 09/27/2021    FLU VAC QIV SPLIT 3 YRS AND OLDER (11227) 12/02/2014    Fluzone Vaccine Medicare () 11/24/2015, 02/13/2017, 11/07/2017, 12/12/2018, 12/17/2019, 09/02/2020    High Dose Fluzone Influenza Vaccine, 65yr+ PF 0.5mL (83788) 02/13/2017    Pneumococcal (Prevnar 13) 11/24/2015    Pneumovax 23 11/07/2017    Zoster Vaccine Live (Zostavax) 06/07/2013        CODE STATUS:  Full Code    ADVANCED CARE PLANNING TEAM: Will need family care plan    CURRENT MEDICATIONS - reviewed and updated on SNF EMR     SUBJECTIVE    Patient seen in follow up, she became unresponsive during therapy on 09/12. Sent to [facility] CT head and chest xray with labs are unremarkable. She was sent back to [facility]. Since then she has been arousable to voice but does sleep a lot. Very pleasant but confused. Brother was at bedside. Therapy to work with her today. VSS. No shortness of breath or chest pain. No nausea or vomiting. Confused at baseline    PHYSICAL EXAM:  VITALS: /77, HR 83, RR 19, O2 @99% on room air  GENERAL HEALTH: 89 y/o confused female, lying in bed, talking about her dog ( stuffed animal)   LINES, TUBES, DRAINS:  none  SKIN: no rashes, no suspicious lesions  WOUND: wound care following    EYES: PERRLA, EOMI, sclera anicteric, conjunctiva normal; there is no nystagmus, no drainage from eyes  HENT: normocephalic; normal nose, no nasal drainage, mucous membranes pink, moist, pharynx no exudate, no visible cerumen.  NECK: supple; FROM; no JVD, no  TMG, no carotid bruits  BREAST: deferred  RESPIRATORY:clear to percussion and auscultation  CARDIOVASCULAR: S1, S2 normal, RRR; no S3, no S4;   ABDOMEN:  normal active BS+, soft, nondistended; no organomegaly, no masses; no bruits; nontender, no guarding, no rebound tenderness.  :no suprapubic distension  LYMPHATIC:no lymphedema  MUSCULOSKELETAL: generalized weakness   EXTREMITIES/VASCULAR:no cyanosis, clubbing or edema  NEUROLOGIC: does not follow commands, confused   PSYCHIATRIC: oriented to self, progressive dementia     DIAGNOSTICS REVIEWED AT THIS VISIT:  LABS 09/04  WBC 4.20, HGB 13.2, CR 1.18, BUN 26, , K 3.7    SEE PLAN BELOW  Episode of unresponsiveness in the gym  9/10/24 and 911 called   - patient was unresponsive but was breathing and had a pulse  - placed on a re -breather mask   -vs stable   -patient went to [facility] ED for eval   -Diagnosed with transient alteration of consciousness   -patient sent back to Rehab   - monitor       Fall  - lives alone , found on floor at her home   - ED workup- Calvarial hematoma, right temporoparietal and left posterior parietal regions, had developed, suggesting subacute swelling or hematoma. MRI brain negative for acute findings .CT brain showed no acute findings   - PT/OT/SPEECH   - Monitor      Weakness generalized  - unclear if participated in therapy eval  -patient needs SW and assistance with placement to LTC/memory care  - cont vitamins - bala oil 450mg po q day , cOq 10 200 mg po daily  , Tumeric 400mg po daily   - poor nutrition - dietician following  pt and provide nutritional  supplements (is a one on one feed)   - monitor      Hypokalemia  - cont potassium chl 20meg po daily   - weekly cbc and bmp in rehab      Acute encephalopathy/  Dementia  - confused  - appears to have progressive dementia and advanced - very confused  - needs one on one feed, discussed with RN   - on aricept 5mg po daily   - monitor      Hx of chest pain  - nitroglycerin 0.4mg po  q 5 min for chest pain prn      Eye pressure   - cont levobunolol ophthalmic soln one drop both eyes daily   - cont latanoprost ophth one drop both eyes q hs      HL  - cont atorvastatin 20mg po q hs      Gout  - present no symptoms  - cont allopurinol 100mg po daily      Hypothyroidism   - cont levothyroxine 25mcg po daily      Fungal skin on legs  - cont to apply miconazole 2% powder to affected area  - wound rn to follow in rehab     Dvt proph /afib  - asa 81mg po daily   - had to stop eliquis due to falls per hospital records      Pain  - norco 5/325mg po q 8 prn   - tylenol 650mg po q 6 prn     FOLLOW UP APPOINTMENTS  No future appointments.     This is a 45 minute visit and greater than 50% of the time was spent counseling the patient and/or coordinating care.    Note to patient: The 21st Century Cures Act makes medical notes like these available to patients in the interest of transparency. However, this is a medical document intended as peer to peer communication. It is written in medical language and may contain abbreviations or verbiage that are unfamiliar. It may appear blunt or direct. Medical documents are intended to carry relevant information, facts as evident, and the clinical opinion of the practitioner who signs the document.     Jazmyne Singh, APRN  09/13/24

## 2024-09-16 ENCOUNTER — SNF VISIT (OUTPATIENT)
Dept: INTERNAL MEDICINE CLINIC | Facility: SKILLED NURSING FACILITY | Age: 89
End: 2024-09-16

## 2024-09-16 ENCOUNTER — HOSPITAL ENCOUNTER (EMERGENCY)
Facility: HOSPITAL | Age: 89
Discharge: HOME | End: 2024-09-16
Attending: EMERGENCY MEDICINE
Payer: MEDICARE

## 2024-09-16 VITALS
SYSTOLIC BLOOD PRESSURE: 116 MMHG | TEMPERATURE: 97 F | DIASTOLIC BLOOD PRESSURE: 80 MMHG | OXYGEN SATURATION: 98 % | HEART RATE: 62 BPM | RESPIRATION RATE: 16 BRPM

## 2024-09-16 DIAGNOSIS — F05 POST-ICTAL CONFUSION: ICD-10-CM

## 2024-09-16 DIAGNOSIS — R55 SYNCOPE, UNSPECIFIED SYNCOPE TYPE: Primary | ICD-10-CM

## 2024-09-16 DIAGNOSIS — I48.91 ATRIAL FIBRILLATION WITH RAPID VENTRICULAR RESPONSE (HCC): ICD-10-CM

## 2024-09-16 DIAGNOSIS — R40.4 UNRESPONSIVE EPISODE: Primary | ICD-10-CM

## 2024-09-16 LAB
ANION GAP SERPL CALC-SCNC: 9 MMOL/L (ref 0–18)
ATRIAL RATE: 150 BPM
BASOPHILS # BLD AUTO: 0.06 X10(3) UL (ref 0–0.2)
BASOPHILS NFR BLD AUTO: 0.6 %
BUN BLD-MCNC: 29 MG/DL (ref 9–23)
BUN/CREAT SERPL: 28.4 (ref 10–20)
CALCIUM BLD-MCNC: 10.1 MG/DL (ref 8.7–10.4)
CHLORIDE SERPL-SCNC: 106 MMOL/L (ref 98–112)
CO2 SERPL-SCNC: 27 MMOL/L (ref 21–32)
CREAT BLD-MCNC: 1.02 MG/DL
DEPRECATED RDW RBC AUTO: 44.6 FL (ref 35.1–46.3)
EGFRCR SERPLBLD CKD-EPI 2021: 53 ML/MIN/1.73M2 (ref 60–?)
EOSINOPHIL # BLD AUTO: 0.09 X10(3) UL (ref 0–0.7)
EOSINOPHIL NFR BLD AUTO: 0.9 %
ERYTHROCYTE [DISTWIDTH] IN BLOOD BY AUTOMATED COUNT: 14.6 % (ref 11–15)
GLUCOSE BLD-MCNC: 106 MG/DL (ref 70–99)
GLUCOSE BLDC GLUCOMTR-MCNC: 95 MG/DL (ref 70–99)
HCT VFR BLD AUTO: 41.5 %
HGB BLD-MCNC: 14.1 G/DL
IMM GRANULOCYTES # BLD AUTO: 0.09 X10(3) UL (ref 0–1)
IMM GRANULOCYTES NFR BLD: 0.9 %
LYMPHOCYTES # BLD AUTO: 1.53 X10(3) UL (ref 1–4)
LYMPHOCYTES NFR BLD AUTO: 15.4 %
MAGNESIUM SERPL-MCNC: 1.7 MG/DL (ref 1.6–2.6)
MCH RBC QN AUTO: 29 PG (ref 26–34)
MCHC RBC AUTO-ENTMCNC: 34 G/DL (ref 31–37)
MCV RBC AUTO: 85.4 FL
MONOCYTES # BLD AUTO: 0.54 X10(3) UL (ref 0.1–1)
MONOCYTES NFR BLD AUTO: 5.4 %
NEUTROPHILS # BLD AUTO: 7.6 X10 (3) UL (ref 1.5–7.7)
NEUTROPHILS # BLD AUTO: 7.6 X10(3) UL (ref 1.5–7.7)
NEUTROPHILS NFR BLD AUTO: 76.8 %
OSMOLALITY SERPL CALC.SUM OF ELEC: 300 MOSM/KG (ref 275–295)
PLATELET # BLD AUTO: 389 10(3)UL (ref 150–450)
POTASSIUM SERPL-SCNC: 3.7 MMOL/L (ref 3.5–5.1)
Q-T INTERVAL: 292 MS
QRS DURATION: 82 MS
QTC CALCULATION (BEZET): 466 MS
R AXIS: 70 DEGREES
RBC # BLD AUTO: 4.86 X10(6)UL
SODIUM SERPL-SCNC: 142 MMOL/L (ref 136–145)
T AXIS: 99 DEGREES
TROPONIN I SERPL HS-MCNC: 23 NG/L
VENTRICULAR RATE: 153 BPM
WBC # BLD AUTO: 9.9 X10(3) UL (ref 4–11)

## 2024-09-16 PROCEDURE — 96374 THER/PROPH/DIAG INJ IV PUSH: CPT

## 2024-09-16 PROCEDURE — 83735 ASSAY OF MAGNESIUM: CPT | Performed by: EMERGENCY MEDICINE

## 2024-09-16 PROCEDURE — 99284 EMERGENCY DEPT VISIT MOD MDM: CPT

## 2024-09-16 PROCEDURE — 84484 ASSAY OF TROPONIN QUANT: CPT | Performed by: EMERGENCY MEDICINE

## 2024-09-16 PROCEDURE — 80048 BASIC METABOLIC PNL TOTAL CA: CPT | Performed by: EMERGENCY MEDICINE

## 2024-09-16 PROCEDURE — 85025 COMPLETE CBC W/AUTO DIFF WBC: CPT | Performed by: EMERGENCY MEDICINE

## 2024-09-16 PROCEDURE — 93005 ELECTROCARDIOGRAM TRACING: CPT

## 2024-09-16 PROCEDURE — 93010 ELECTROCARDIOGRAM REPORT: CPT

## 2024-09-16 PROCEDURE — 82962 GLUCOSE BLOOD TEST: CPT

## 2024-09-16 RX ORDER — DILTIAZEM HYDROCHLORIDE 5 MG/ML
10 INJECTION INTRAVENOUS ONCE
Status: COMPLETED | OUTPATIENT
Start: 2024-09-16 | End: 2024-09-16

## 2024-09-16 NOTE — ED INITIAL ASSESSMENT (HPI)
Via ems from [facility] for unwitnessed syncopal episode. Seen here a week ago for same thing. Patient uncooperative. Acting appropriately to baseline.

## 2024-09-16 NOTE — ED QUICK NOTES
VAHID Muñiz. Patient was found difficult to arouse by CNA. Called 911. Per RN, patient has been having poor PO intake, refusing medications, and uncoopeative since arrival to [facility].

## 2024-09-16 NOTE — PROGRESS NOTES
Nurse practitioners responded to a Code Blue    Patient was unresponsive in facility - had a pulse and was breathing.    Sternal rubs - no response  Verbal stimulus - no response    IV started in Right hand and 0.9NS started wide open        Patient started coming out of episode and resembles being post ictal.   - disoriented, but slowly responding. Has a history of progressive dementia and oriented to self at baseline      She had a similar episode on 09/10  CT head on 09/10 was unremarkable  Rosie chest on 09/10 was unremarkable    Dx at that time was \"transient altertaion of awareness\"    Called 911 and sending to [facility] ED  - work up maybe needed. Patient doesn't have a history of Seizures - could this be new onset?  - possible dehydration? Last Labs 09/04 - WBC 4.0, Hgb 13.2, Cr 1.18, BUN 26, Glucose 68, , K 3.7  - poor nutrition? Not eating well. And refuses medications    Labs pending for 09/17/24    ALYSHA Peres

## 2024-09-17 ENCOUNTER — EXTERNAL FACILITY (OUTPATIENT)
Dept: INTERNAL MEDICINE CLINIC | Facility: CLINIC | Age: 89
End: 2024-09-17

## 2024-09-17 DIAGNOSIS — R55 SYNCOPE, UNSPECIFIED SYNCOPE TYPE: Primary | ICD-10-CM

## 2024-09-17 DIAGNOSIS — I48.0 PAROXYSMAL ATRIAL FIBRILLATION (HCC): ICD-10-CM

## 2024-09-17 PROCEDURE — 99309 SBSQ NF CARE MODERATE MDM 30: CPT | Performed by: INTERNAL MEDICINE

## 2024-09-17 NOTE — PROGRESS NOTES
follow up from er    Past Medical History:   ASHD (arteriosclerotic heart disease)   Concussion   from fall   Essential hypertension   Gastric ulcer without hemorrhage or perforation, unspecified chronicity   Glaucoma   Gout   HTN (hypertension)   Hyperlipidemia   OA (osteoarthritis)   Osteopenia   last dexa 2015 Frax score 14% and 3.5% Frax 2017 155% and 4.7%    Past Surgical History:  Procedure Laterality Date   Breast biopsy   Cataract extraction Bilateral   Hysterectomy   Knee arthroscopy Right   Other surgical history Left 2013   L femur fracture with redo   Pt w/ coronary artery stent 2012   bare metal    Social History    Socioeconomic History   Marital status: Single  Tobacco Use   Smoking status: Never   Smokeless tobacco: Never  Vaping Use   Vaping status: Never Used  Substance and Sexual Activity   Alcohol use: Not Currently   Alcohol/week: 2.0 standard drinks of alcohol   Types: 2 Glasses of wine per week   Drug use: No    S  Allergies  Allergen Reactions   Streptomycin OTHER (SEE COMMENTS)   numbness   Amoxicillin   Bee Venom   Penicillins   Vioxx [Rofecoxib]    CURRENT MEDICATIONS - reviewed    REVIEW OF SYSTEMS:  at her baseline , she has no complains    PHYSICAL EXAM:    SKIN: no rashes, no suspicious lesions  WOUND: wound care to eval  EYES: PERRLA, EOMI, sclera anicteric, conjunctiva normal; there is no nystagmus, no drainage from eyes  HENT: normocephalic; normal nose, no nasal drainage, mucous membranes pink, moist, pharynx no exudate, no visible cerumen.  NECK: supple; FROM; no JVD, no TMG, no carotid bruits  BREAST: deferred  RESPIRATORY:clear to percussion and auscultation  CARDIOVASCULAR: S1, S2 normal, RRR; no S3, no S4;  ABDOMEN: normal active BS+, soft, nondistended; no organomegaly, no masses; no bruits; nontender, no guarding, no rebound tenderness.  :no suprapubic distension  LYMPHATIC:no lymphedema  MUSCULOSKELETAL: generalized weakness  EXTREMITIES/VASCULAR:no cyanosis, clubbing or  edema  NEUROLOGIC: does not follow commands, confused  PSYCHIATRIC: oriented to self, progressive dementia    a/p    PLAN : syncope ? seizure  recurrent , cardio and neurology consult, fall precautions ptot    1. Fall    -ED workup- Calvarial hematoma, right temporoparietal and left posterior parietal regions, had developed, suggesting subacute swelling or hematoma. MRI brain negative for acute findings .CT brain showed no acute findings  -monitor for fall risk  ptot  2. Weakness generalized  ptot  3. Hypokalemia  -cont potassium chl 20meg po daily  -monitor bmp    4. Acute encephalopathy/ Dementia  - follow up with neurology    5. Hx of chest pain  -nitroglycerin 0.4mg po q 5 min for chest pain prn    6. Eye pressure  -cont levobunolol ophthalmic soln one drop both eyes daily  -cont latanoprost ophth one drop both eyes q hs    7. HL  -cont atorvastatin 20mg po q hs    8.Gout    -cont allopurinol 100mg po daily    9. Hypothyroidism  -cont levothyroxine 25mcg po daily    10. Fungal skin on legs  -cont to apply miconazole 2% powder to affected area  -wound care

## 2024-09-18 ENCOUNTER — SNF VISIT (OUTPATIENT)
Dept: INTERNAL MEDICINE CLINIC | Facility: SKILLED NURSING FACILITY | Age: 89
End: 2024-09-18

## 2024-09-18 VITALS
SYSTOLIC BLOOD PRESSURE: 145 MMHG | RESPIRATION RATE: 18 BRPM | TEMPERATURE: 98 F | OXYGEN SATURATION: 98 % | HEART RATE: 84 BPM | DIASTOLIC BLOOD PRESSURE: 65 MMHG

## 2024-09-18 DIAGNOSIS — N18.30 STAGE 3 CHRONIC KIDNEY DISEASE, UNSPECIFIED WHETHER STAGE 3A OR 3B CKD (HCC): Primary | ICD-10-CM

## 2024-09-18 DIAGNOSIS — I10 PRIMARY HYPERTENSION: ICD-10-CM

## 2024-09-18 DIAGNOSIS — K21.9 GASTROESOPHAGEAL REFLUX DISEASE WITHOUT ESOPHAGITIS: ICD-10-CM

## 2024-09-18 DIAGNOSIS — E03.9 HYPOTHYROIDISM, UNSPECIFIED TYPE: ICD-10-CM

## 2024-09-18 DIAGNOSIS — G93.40 ACUTE ENCEPHALOPATHY: ICD-10-CM

## 2024-09-18 DIAGNOSIS — F03.90 DEMENTIA, UNSPECIFIED DEMENTIA SEVERITY, UNSPECIFIED DEMENTIA TYPE, UNSPECIFIED WHETHER BEHAVIORAL, PSYCHOTIC, OR MOOD DISTURBANCE OR ANXIETY (HCC): ICD-10-CM

## 2024-09-18 DIAGNOSIS — I25.10 CORONARY ARTERY DISEASE INVOLVING NATIVE CORONARY ARTERY OF NATIVE HEART WITHOUT ANGINA PECTORIS: ICD-10-CM

## 2024-09-18 DIAGNOSIS — R53.1 WEAKNESS GENERALIZED: ICD-10-CM

## 2024-09-18 DIAGNOSIS — E78.00 HYPERCHOLESTEROLEMIA: ICD-10-CM

## 2024-09-18 DIAGNOSIS — I48.91 ATRIAL FIBRILLATION, UNSPECIFIED TYPE (HCC): ICD-10-CM

## 2024-09-18 PROCEDURE — 99309 SBSQ NF CARE MODERATE MDM 30: CPT

## 2024-09-18 NOTE — PROGRESS NOTES
Karen Finney  : 1935  Age 89 year old  female patient is admitted to [facility] for rehabilitation and strengthening         Chief complaint: Acute encephalopathy , progressive dementia, sent to [facility] ED 9/10/24 for episode of unresponsiveness in therapy gym       HPI   89-year-old  female with history of chronic atrial fibrillation, hypertension, noncompliant with her medications who was seen by her primary care physician 2 weeks ago and given a prescription of her medications. Last week when brother called her she didn't answer and then found her lying on the floor in her house. Patient was alert, but could not recall for how long she had been on the floor. ED workup done 24  vitals stable,. CT brain showed no acute findings. There was soft tissue subcutaneous swelling. Calvarial hematoma, right temporoparietal and left posterior parietal regions, had developed, suggesting subacute swelling or hematoma. MRI brain negative for acute findings.EKG showed atrial fibrillation, rate between 90 and 110, chronic history. Neurology was consulted for encephalopathy, and based on history and exam, it was deemed she had progression of her dementia, likely undiagnosed late onset Alzheimer's disease. During hospitalization she was seen hold and talking to stuffed puppy animal as if it was a real pet. She appeared disheveled and AxO1-2.Eliquis for Afib stopped due to falls and concern for compliancy with dementia. Outpatient neurology appointment provided. She was stabilized and sent to [facility] for rehabilitation. Pt has not been compliant with her medication in [facility], developed a syncopal episode, code blue was called and pt was sent to [place] ED on 24, Pt's mental status returned to baseline upon arrival to the hospital. Pt was found to be in Afib RVR, received 10 mg of IV Diltiazem and heart rate returned to NSR 87. Blood work up in ED wnl. Pt was subsequently discharged back to  [facility] for rehab and strengthening.     Pt seen and examined in follow up. Pt is resting in bed, appears comfortable in NAD. She yelled out that she wanted to be sat up in bed and with a very upset tone asked me what I wanted from her. She further told me she felt well, denied having chest pain or sob. She appeared to be confused, but told me she was eating well and denied constipation or diarrhea. She participates in PT, but can be difficult and not compliant at times. No other issues/concerns. Vss     ALLERGIES:  Allergies   Allergen Reactions    Streptomycin OTHER (SEE COMMENTS)     numbness    Amoxicillin     Bee Venom     Penicillins     Vioxx [Rofecoxib]        IMMUNIZATIONS  Immunization History   Administered Date(s) Administered    Covid-19 Vaccine Pfizer 30 mcg/0.3 ml 03/05/2021, 03/26/2021, 10/27/2021    FLU VAC High Dose 65 YRS & Older PRSV Free (20109) 11/24/2015, 11/07/2017, 12/12/2018, 12/17/2019, 09/02/2020, 09/27/2021    FLU VAC QIV SPLIT 3 YRS AND OLDER (71436) 12/02/2014    Fluzone Vaccine Medicare () 11/24/2015, 02/13/2017, 11/07/2017, 12/12/2018, 12/17/2019, 09/02/2020    High Dose Fluzone Influenza Vaccine, 65yr+ PF 0.5mL (43444) 02/13/2017    Pneumococcal (Prevnar 13) 11/24/2015    Pneumovax 23 11/07/2017    Zoster Vaccine Live (Zostavax) 06/07/2013        CODE STATUS:  Full Code    ADVANCED CARE PLANNING TEAM: Will need family care plan    CURRENT MEDICATIONS - reviewed and updated on SNF EMR     SUBJECTIVE    Pt seen and examined in follow up. Pt is resting in bed, appears comfortable in NAD. She yelled out that she wanted to be sat up in bed and with a very upset tone asked me what I wanted from her. She further told me she felt well, denied having chest pain or sob. She appeared to be confused, but told me she was eating well and denied constipation or diarrhea. She participates in PT, but can be difficult and not compliant at times. No other issues/concerns. Vss     PHYSICAL  EXAM:  Vitals:    09/18/24 1255   BP: 145/65   Pulse: 84   Resp: 18   Temp: 98 °F (36.7 °C)   SpO2: 98%     GENERAL HEALTH: 89 y/o confused female, lying in bed, talking about her dog ( stuffed animal)   LINES, TUBES, DRAINS:  none  SKIN: no rashes, no suspicious lesions  WOUND: wound care following    EYES: PERRLA, EOMI, sclera anicteric, conjunctiva normal; there is no nystagmus, no drainage from eyes  HENT: normocephalic; normal nose, no nasal drainage, mucous membranes pink, moist, pharynx no exudate, no visible cerumen.  NECK: supple; FROM; no JVD, no TMG, no carotid bruits  BREAST: deferred  RESPIRATORY:clear to percussion and auscultation  CARDIOVASCULAR: S1, S2 normal, RRR; no S3, no S4;   ABDOMEN:  normal active BS+, soft, nondistended; no organomegaly, no masses; no bruits; nontender, no guarding, no rebound tenderness.  :no suprapubic distension  LYMPHATIC:no lymphedema  MUSCULOSKELETAL: generalized weakness   EXTREMITIES/VASCULAR:no cyanosis, clubbing or edema  NEUROLOGIC: does not follow commands, confused   PSYCHIATRIC: oriented to self, progressive dementia     DIAGNOSTICS REVIEWED AT THIS VISIT:  LABS 09/04: WBC 4.20, HGB 13.2, CR 1.18, BUN 26, , K 3.7  Pt has been refusing labs work     SEE PLAN BELOW    Episode of unresponsiveness in the gym  9/10/24 and 911 called   - patient was unresponsive but was breathing and had a pulse  - placed on a re -breather mask   -vs stable   -patient went to [facility] ED for eval   -Diagnosed with transient alteration of consciousness   -patient sent back to Rehab   - monitor      Fall  - lives alone , found on floor at her home   - ED workup- Calvarial hematoma, right temporoparietal and left posterior parietal regions, had developed, suggesting subacute swelling or hematoma. MRI brain negative for acute findings .CT brain showed no acute findings   - PT/OT/SPEECH   - Monitor      Weakness generalized  - unclear if participated in therapy eval  -patient needs SW  and assistance with placement to LTC/memory care  - cont vitamins - bala oil 450mg po q day , cOq 10 200 mg po daily  , Tumeric 400mg po daily   - poor nutrition - dietician following  pt and provide nutritional  supplements (is a one on one feed)   - monitor      Hypokalemia  - cont potassium chl 20meg po daily   - weekly cbc and bmp in rehab      Acute encephalopathy/  Dementia  - confused  - appears to have progressive dementia and advanced - very confused  - needs one on one feed, discussed with RN   - on aricept 5mg po daily   - monitor      Hx of chest pain  - nitroglycerin 0.4mg po q 5 min for chest pain prn      Eye pressure   - cont levobunolol ophthalmic soln one drop both eyes daily   - cont latanoprost ophth one drop both eyes q hs      HL  - cont atorvastatin 20mg po q hs      Gout  - present no symptoms  - cont allopurinol 100mg po daily      Hypothyroidism   - cont levothyroxine 25mcg po daily      Fungal skin on legs  - cont to apply miconazole 2% powder to affected area  - wound rn to follow in rehab     Dvt proph /afib  - asa 81mg po daily   - had to stop eliquis due to falls per hospital records      Pain  - norco 5/325mg po q 8 prn   - tylenol 650mg po q 6 prn     FOLLOW UP APPOINTMENTS  No future appointments.     35 minutes spent w/ patient and staff, including but not limited to/ reviewing present status, needs, abilities with disciplines, reviewing medical records, vital signs, labs, completing medication reconciliation and entering orders for continued care in [facility]     Note to patient: The 21st Century Cures Act makes medical notes like these available to patients in the interest of transparency. However, this is a medical document intended as peer to peer communication. It is written in medical language and may contain abbreviations or verbiage that are unfamiliar. It may appear blunt or direct. Medical documents are intended to carry relevant information, facts as evident, and the  clinical opinion of the practitioner who signs the document.     Myriam Elaine, APRN   09/18/24

## 2024-09-19 ENCOUNTER — EXTERNAL FACILITY (OUTPATIENT)
Dept: INTERNAL MEDICINE CLINIC | Facility: CLINIC | Age: 89
End: 2024-09-19

## 2024-09-19 DIAGNOSIS — I48.0 PAROXYSMAL ATRIAL FIBRILLATION (HCC): ICD-10-CM

## 2024-09-19 DIAGNOSIS — R55 SYNCOPE, UNSPECIFIED SYNCOPE TYPE: Primary | ICD-10-CM

## 2024-09-19 PROCEDURE — 99308 SBSQ NF CARE LOW MDM 20: CPT | Performed by: INTERNAL MEDICINE

## 2024-09-19 NOTE — PROGRESS NOTES
follow up    Past Medical History:   ASHD (arteriosclerotic heart disease)   Concussion   from fall   Essential hypertension   Gastric ulcer without hemorrhage or perforation, unspecified chronicity   Glaucoma   Gout   HTN (hypertension)   Hyperlipidemia   OA (osteoarthritis)   Osteopenia   last dexa 2015 Frax score 14% and 3.5% Frax 2017 155% and 4.7%    Past Surgical History:  Procedure Laterality Date   Breast biopsy   Cataract extraction Bilateral   Hysterectomy   Knee arthroscopy Right   Other surgical history Left 2013   L femur fracture with redo   Pt w/ coronary artery stent 2012   bare metal    Social History    Socioeconomic History   Marital status: Single  Tobacco Use   Smoking status: Never   Smokeless tobacco: Never  Vaping Use   Vaping status: Never Used  Substance and Sexual Activity   Alcohol use: Not Currently   Alcohol/week: 2.0 standard drinks of alcohol   Types: 2 Glasses of wine per week   Drug use: No    S  Allergies  Allergen Reactions   Streptomycin OTHER (SEE COMMENTS)   numbness   Amoxicillin   Bee Venom   Penicillins   Vioxx [Rofecoxib]    CURRENT MEDICATIONS - reviewed    REVIEW OF SYSTEMS:  she has no complains, trying to get up     PHYSICAL EXAM:    SKIN: no rashes, no suspicious lesions    EYES: PERRLA, EOMI, sclera anicteric, conjunctiva normal; there is no nystagmus, no drainage from eyes  HENT: normocephalic; normal nose, no nasal drainage, mucous membranes pink, moist, pharynx no exudate, no visible cerumen.  NECK: supple; FROM; no JVD, no TMG, no carotid bruits  BREAST: deferred  RESPIRATORY:clear to percussion and auscultation  CARDIOVASCULAR: S1, S2 normal, RRR; no S3, no S4;  ABDOMEN: normal active BS+, soft, nondistended; no organomegaly, no masses; no bruits; nontender, no guarding, no rebound tenderness.  :no suprapubic distension  LYMPHATIC:no lymphedema  MUSCULOSKELETAL: generalized weakness  EXTREMITIES/VASCULAR:no cyanosis, clubbing or edema  NEUROLOGIC: does not  follow commands, confused  PSYCHIATRIC: oriented to self, progressive dementia    a/p    PLAN : syncope /no more episodes , neurology consult , nurse to schedule , monitor , afib stable , continue with current meds, ptot       1. Fall    -ED workup- Calvarial hematoma, right temporoparietal and left posterior parietal regions, had developed, suggesting subacute swelling or hematoma. MRI brain negative for acute findings .CT brain showed no acute findings  -monitor for fall risk  ptot  2. Weakness generalized  ptot  3. Hypokalemia  -cont potassium chl 20meg po daily  -monitor bmp    4. Acute encephalopathy/ Dementia  - follow up with neurology    5. Hx of chest pain  -nitroglycerin 0.4mg po q 5 min for chest pain prn    6. Eye pressure  -cont levobunolol ophthalmic soln one drop both eyes daily  -cont latanoprost ophth one drop both eyes q hs    7. HL  -cont atorvastatin 20mg po q hs    8.Gout    -cont allopurinol 100mg po daily    9. Hypothyroidism  -cont levothyroxine 25mcg po daily    10. Fungal skin on legs  -cont to apply miconazole 2% powder to affected area  -wound care

## 2024-09-23 ENCOUNTER — SNF VISIT (OUTPATIENT)
Dept: INTERNAL MEDICINE CLINIC | Facility: SKILLED NURSING FACILITY | Age: 89
End: 2024-09-23

## 2024-09-23 VITALS
BODY MASS INDEX: 19 KG/M2 | TEMPERATURE: 98 F | HEART RATE: 86 BPM | DIASTOLIC BLOOD PRESSURE: 76 MMHG | SYSTOLIC BLOOD PRESSURE: 133 MMHG | WEIGHT: 112 LBS | RESPIRATION RATE: 18 BRPM | OXYGEN SATURATION: 97 %

## 2024-09-23 DIAGNOSIS — G93.40 ACUTE ENCEPHALOPATHY: ICD-10-CM

## 2024-09-23 DIAGNOSIS — H57.9 EYE PRESSURE: ICD-10-CM

## 2024-09-23 DIAGNOSIS — F03.C0 SEVERE DEMENTIA, UNSPECIFIED DEMENTIA TYPE, UNSPECIFIED WHETHER BEHAVIORAL, PSYCHOTIC, OR MOOD DISTURBANCE OR ANXIETY (HCC): ICD-10-CM

## 2024-09-23 DIAGNOSIS — R53.1 GENERALIZED WEAKNESS: ICD-10-CM
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
## 2024-09-23 DIAGNOSIS — R40.4 RECURRENT EPISODES OF UNRESPONSIVENESS: ICD-10-CM

## 2024-09-23 NOTE — PROGRESS NOTES
Karen Finney  : 1935  Age 89 year old  female patient is admitted to   [facility] for rehabilitation and strengthening         Chief complaint: Acute encephalopathy , progressive dementia, sent to [facility] ED 9/10/24 for episode of unresponsiveness in therapy gym       HPI   89-year-old  female with history of chronic atrial fibrillation, hypertension, noncompliant with her medications who was seen by her primary care physician 2 weeks ago and given a prescription of her medications. Last week when brother called her she didn't answer and then found her lying on the floor in her house. Patient was alert, but could not recall for how long she had been on the floor. ED workup done 24  vitals stable,. CT brain showed no acute findings. There was soft tissue subcutaneous swelling. Calvarial hematoma, right temporoparietal and left posterior parietal regions, had developed, suggesting subacute swelling or hematoma. MRI brain negative for acute findings.EKG showed atrial fibrillation, rate between 90 and 110, chronic history. Neurology was consulted for encephalopathy, and based on history and exam, it was deemed she had progression of her dementia, likely undiagnosed late onset Alzheimer's disease. During hospitalization she was seen hold and talking to stuffed puppy animal as if it was a real pet. She appeared disheveled and AxO1-2.Eliquis for Afib stopped due to falls and concern for compliancy with dementia. Outpatient neurology appointment provided. She was stabilized and sent to [facility] for rehabilitation. Pt has not been compliant with her medication in [facility], developed a syncopal episode, code blue was called and pt was sent to [place] ED on 24, Pt's mental status returned to baseline upon arrival to the hospital. Pt was found to be in Afib RVR, received 10 mg of IV Diltiazem and heart rate returned to NSR 87. Blood work up in ED wnl. Pt was subsequently discharged back  to [facility] for rehab and strengthening.      Pt seen and examined in follow up. VSS .  Pt is sitting in wheelchair in large activity room ,accompanied with brother , who is going with her to cardiology this afternoon , appears comfortable in NAD. Oriented to self, pleasant and calm.  She further told me she felt well, denied having chest pain or sob. She appeared to be confused which is her baseline , but told me she was eating well and denied constipation or diarrhea. She participates in PT, but can be difficult and not compliant at times.  Very hard of hearing . No other issues/concerns.      ALLERGIES:  Allergies   Allergen Reactions    Streptomycin OTHER (SEE COMMENTS)     numbness    Amoxicillin     Bee Venom     Penicillins     Vioxx [Rofecoxib]        CODE STATUS:  Full Code    ADVANCED CARE PLANNING TEAM: Will need family care plan     CURRENT MEDICATIONS - reviewed and updated     Current Outpatient Medications   Medication Sig Dispense Refill    donepezil 10 MG Oral Tab Take 0.5 tablets (5 mg total) by mouth every morning for 30 days, THEN 1 tablet (10 mg total) every morning. 105 tablet 0    Potassium Chloride ER 20 MEQ Oral Tab CR Take 20 mEq by mouth daily. 2 tablet 0    rosuvastatin 10 MG Oral Tab Take 1 tablet (10 mg total) by mouth nightly. 90 tablet 1    levothyroxine 25 MCG Oral Tab Take 1 tablet (25 mcg total) by mouth every morning before breakfast. 90 tablet 3    allopurinol 100 MG Oral Tab Take 1 tablet (100 mg total) by mouth daily. 90 tablet 3    HYDROcodone-acetaminophen 5-325 MG Oral Tab Take 1 tablet by mouth every 8 (eight) hours as needed. 15 tablet 0    losartan 100 MG Oral Tab Take 0.5 tablets (50 mg total) by mouth daily. 90 tablet 1    HYDROcodone-acetaminophen 5-325 MG Oral Tab TAKE 1/2 TO 1 TABLET BY MOUTH DAILY AS NEEDED FOR PAIN 30 tablet 0    Levobunolol HCl 0.5 % Ophthalmic Solution       Coenzyme Q10 (CO Q-10) 200 MG Oral Cap Take by mouth.      Turmeric (QC TUMERIC  COMPLEX OR) Take 400 mg by mouth.      Omega-3 Fatty Acids (OMEGA-3 2100 OR) Take by mouth.      Ginger, Zingiber officinalis, (GINGER OR) Take 450 mg by mouth daily.      Miconazole Nitrate 2 % External Powder Apply topically as needed for Itching.      aspirin 81 MG Oral Tab Take 1 tablet (81 mg total) by mouth daily. 30 tablet 11    nitroGLYCERIN 0.4 MG Sublingual SL Tab Place 1 tablet (0.4 mg total) under the tongue every 5 (five) minutes as needed for Chest pain. 20 tablet 1    latanoprost (XALATAN) 0.005 % Ophthalmic Solution One droplet in each eye QHS.          VITALS:  /76   Pulse 86   Temp 97.5 °F (36.4 °C)   Resp 18   Wt 112 lb (50.8 kg)   SpO2 97%   BMI 18.64 kg/m²       REVIEW OF SYSTEMS:  Pt seen and examined in follow up. VSS.  Pt is sitting in wheelchair, accompanied with brother, they are  waiting to go to [facility] to see Cardiology.  appears comfortable in NAD.  She further told me she felt well, denied having chest pain or sob. She appeared to be confused- at baseline , but  reported she was eating well and denied constipation or diarrhea. She participates in PT, but can be difficult and not compliant at times. No other issues/concerns.       PHYSICAL EXAM:        GENERAL HEALTH: 89 y/o confused female, lying in bed, talking about her dog ( stuffed animal)   LINES, TUBES, DRAINS:  none  SKIN: no rashes, no suspicious lesions  WOUND: wound care following    EYES: PERRLA, EOMI, sclera anicteric, conjunctiva normal; there is no nystagmus, no drainage from eyes  HENT: normocephalic; normal nose, no nasal drainage, mucous membranes pink, moist, pharynx no exudate, no visible cerumen.  NECK: supple; FROM; no JVD, no TMG, no carotid bruits  BREAST: deferred  RESPIRATORY:clear to percussion and auscultation  CARDIOVASCULAR: S1, S2 normal, RRR; no S3, no S4;   ABDOMEN:  normal active BS+, soft, nondistended; no organomegaly, no masses; no bruits; nontender, no guarding, no rebound tenderness.  :no  suprapubic distension  LYMPHATIC:no lymphedema  MUSCULOSKELETAL: generalized weakness   EXTREMITIES/VASCULAR:no cyanosis, clubbing or edema  NEUROLOGIC: does not follow commands, confused   PSYCHIATRIC: oriented to self, progressive dementia      SEE PLAN BELOW     Episode of unresponsiveness in the gym  9/10/24 and 911 called   -and almost the same episode 9/16/24 in her room , 911 called and workup done in ED and pt returned to rehab   - patient was unresponsive but was breathing and had a pulse  - placed on a re -breather mask   -vs stable   -patient went to [facility] ED for eval   -Diagnosed with transient alteration of consciousness   -patient sent back to Rehab   -patient seeing Cards in [facility]  this afternoon, episodes may be cardiac related- unclear, brother accompanying her this afternoon   - monitor      Fall  - lives alone , found on floor at her home   - ED workup- Calvarial hematoma, right temporoparietal and left posterior parietal regions, had developed, suggesting subacute swelling or hematoma. MRI brain negative for acute findings .CT brain showed no acute findings   - PT/OT/SPEECH   - Monitor      Weakness generalized  - unclear if participated in therapy eval  -patient needs SW and assistance with placement to LTC/memory care  - cont vitamins - bala oil 450mg po q day , cOq 10 200 mg po daily  , Tumeric 400mg po daily   - poor nutrition - dietician following  pt and provide nutritional  supplements (is a one on one feed)   - monitor      Hypokalemia  - cont potassium chl 20meg po daily   - weekly cbc and bmp in rehab      Acute encephalopathy/  Dementia  - confused  - appears to have progressive dementia and advanced - very confused  - needs one on one feed, discussed with RN   - on aricept 5mg po daily   - monitor      Hx of chest pain  - nitroglycerin 0.4mg po q 5 min for chest pain prn      Eye pressure   - cont levobunolol ophthalmic soln one drop both eyes daily   - cont latanoprost ophth one drop  both eyes q hs      HL  - cont atorvastatin 20mg po q hs      Gout  - present no symptoms  - cont allopurinol 100mg po daily      Hypothyroidism   - cont levothyroxine 25mcg po daily      Fungal skin on legs  - cont to apply miconazole 2% powder to affected area  - wound rn to follow in rehab     Dvt proph /afib  - asa 81mg po daily   - had to stop eliquis due to falls per hospital records      Pain  - norco 5/325mg po q 8 prn   - tylenol 650mg po q 6 prn       This is a 25 minute visit and greater than 50% of the time was spent counseling the patient and/or coordinating care.    Kenzie Salinas, APRN  09/23/24   11:04 AM

## 2024-09-24 ENCOUNTER — EXTERNAL FACILITY (OUTPATIENT)
Dept: INTERNAL MEDICINE CLINIC | Facility: CLINIC | Age: 89
End: 2024-09-24

## 2024-09-24 DIAGNOSIS — I48.0 PAROXYSMAL ATRIAL FIBRILLATION (HCC): ICD-10-CM

## 2024-09-24 DIAGNOSIS — I10 PRIMARY HYPERTENSION: Primary | ICD-10-CM

## 2024-09-24 PROCEDURE — 99308 SBSQ NF CARE LOW MDM 20: CPT | Performed by: INTERNAL MEDICINE

## 2024-09-24 NOTE — PROGRESS NOTES
follow up    Past Medical History:   ASHD (arteriosclerotic heart disease)   Concussion   from fall   Essential hypertension   Gastric ulcer without hemorrhage or perforation, unspecified chronicity   Glaucoma   Gout   HTN (hypertension)   Hyperlipidemia   OA (osteoarthritis)   Osteopenia   last dexa 2015 Frax score 14% and 3.5% Frax 2017 155% and 4.7%    Past Surgical History:  Procedure Laterality Date   Breast biopsy   Cataract extraction Bilateral   Hysterectomy   Knee arthroscopy Right   Other surgical history Left 2013   L femur fracture with redo   Pt w/ coronary artery stent 2012   bare metal    Social History    Socioeconomic History   Marital status: Single  Tobacco Use   Smoking status: Never   Smokeless tobacco: Never  Vaping Use   Vaping status: Never Used  Substance and Sexual Activity   Alcohol use: Not Currently   Alcohol/week: 2.0 standard drinks of alcohol   Types: 2 Glasses of wine per week   Drug use: No    S  Allergies  Allergen Reactions   Streptomycin OTHER (SEE COMMENTS)   numbness   Amoxicillin   Bee Venom   Penicillins   Vioxx [Rofecoxib]    CURRENT MEDICATIONS - reviewed    REVIEW OF SYSTEMS:  she is at her baseline, no complains    PHYSICAL EXAM:    SKIN: no rashes, no suspicious lesions    EYES: PERRLA, EOMI, sclera anicteric, conjunctiva normal; there is no nystagmus, no drainage from eyes  HENT: normocephalic; normal nose, no nasal drainage, mucous membranes pink, moist, pharynx no exudate, no visible cerumen.  NECK: supple; FROM; no JVD, no TMG, no carotid bruits  BREAST: deferred  RESPIRATORY:clear to percussion and auscultation  CARDIOVASCULAR: S1, S2 normal, RRR; no S3, no S4;  ABDOMEN: normal active BS+, soft, nondistended; no organomegaly, no masses; no bruits; nontender, no guarding, no rebound tenderness.  :no suprapubic distension  LYMPHATIC:no lymphedema  MUSCULOSKELETAL: generalized weakness  EXTREMITIES/VASCULAR:no cyanosis, clubbing or edema  NEUROLOGIC: does not follow  commands, confused  PSYCHIATRIC: oriented to self, progressive dementia    a/p    PLAN : afib stable - continue with current meds, she is following with cardiology , sacral wound - wound care is following, hypothyroidism continue with meds      1. Fall    -ED workup- Calvarial hematoma, right temporoparietal and left posterior parietal regions, had developed, suggesting subacute swelling or hematoma. MRI brain negative for acute findings .CT brain showed no acute findings  -monitor for fall risk  ptot  2. Weakness generalized  ptot  3. Hypokalemia  -cont potassium chl 20meg po daily  -monitor bmp    4. Acute encephalopathy/ Dementia  - follow up with neurology    5. Hx of chest pain  -nitroglycerin 0.4mg po q 5 min for chest pain prn    6. Eye pressure  -cont levobunolol ophthalmic soln one drop both eyes daily  -cont latanoprost ophth one drop both eyes q hs    7. HL  -cont atorvastatin 20mg po q hs    8.Gout    -cont allopurinol 100mg po daily    9. Hypothyroidism  -cont levothyroxine 25mcg po daily    10. Fungal skin on legs  -cont to apply miconazole 2% powder to affected area  -wound care

## 2024-09-26 ENCOUNTER — EXTERNAL FACILITY (OUTPATIENT)
Dept: INTERNAL MEDICINE CLINIC | Facility: CLINIC | Age: 89
End: 2024-09-26

## 2024-09-26 DIAGNOSIS — I48.0 PAROXYSMAL ATRIAL FIBRILLATION (HCC): ICD-10-CM

## 2024-09-26 DIAGNOSIS — F01.50 VASCULAR DEMENTIA WITHOUT BEHAVIORAL DISTURBANCE, PSYCHOTIC DISTURBANCE, MOOD DISTURBANCE, OR ANXIETY, UNSPECIFIED DEMENTIA SEVERITY (HCC): ICD-10-CM

## 2024-09-26 DIAGNOSIS — I10 PRIMARY HYPERTENSION: Primary | ICD-10-CM

## 2024-09-26 PROCEDURE — 99308 SBSQ NF CARE LOW MDM 20: CPT | Performed by: INTERNAL MEDICINE

## 2024-09-26 NOTE — PROGRESS NOTES
follow up    Past Medical History:   ASHD (arteriosclerotic heart disease)   Concussion   from fall   Essential hypertension   Gastric ulcer without hemorrhage or perforation, unspecified chronicity   Glaucoma   Gout   HTN (hypertension)   Hyperlipidemia   OA (osteoarthritis)   Osteopenia   last dexa 2015 Frax score 14% and 3.5% Frax 2017 155% and 4.7%    Past Surgical History:  Procedure Laterality Date   Breast biopsy   Cataract extraction Bilateral   Hysterectomy   Knee arthroscopy Right   Other surgical history Left 2013   L femur fracture with redo   Pt w/ coronary artery stent 2012   bare metal    Social History    Socioeconomic History   Marital status: Single  Tobacco Use   Smoking status: Never   Smokeless tobacco: Never  Vaping Use   Vaping status: Never Used  Substance and Sexual Activity   Alcohol use: Not Currently   Alcohol/week: 2.0 standard drinks of alcohol   Types: 2 Glasses of wine per week   Drug use: No    S  Allergies  Allergen Reactions   Streptomycin OTHER (SEE COMMENTS)   numbness   Amoxicillin   Bee Venom   Penicillins   Vioxx [Rofecoxib]    CURRENT MEDICATIONS - reviewed    REVIEW OF SYSTEMS:  she has no complains, per nurse they have hard time giving her medications, she is refusing    PHYSICAL EXAM:    SKIN: no rashes, no suspicious lesions    EYES: PERRLA, EOMI, sclera anicteric, conjunctiva normal; there is no nystagmus, no drainage from eyes  HENT: normocephalic; normal nose, no nasal drainage, mucous membranes pink, moist, pharynx no exudate, no visible cerumen.  NECK: supple; FROM; no JVD, no TMG, no carotid bruits  BREAST: deferred  RESPIRATORY:clear to percussion and auscultation  CARDIOVASCULAR: S1, S2 normal, RRR; no S3, no S4;  ABDOMEN: normal active BS+, soft, nondistended; no organomegaly, no masses; no bruits; nontender, no guarding, no rebound tenderness.  :no suprapubic distension  LYMPHATIC:no lymphedema  MUSCULOSKELETAL: generalized weakness  EXTREMITIES/VASCULAR:no  cyanosis, clubbing or edema  NEUROLOGIC: does not follow commands, confused  PSYCHIATRIC: oriented to self, progressive dementia    a/p    PLAN: sacral wound - continue with wound care, afib stable, dementia- continue with supportive care , ptot    1. Fall    -ED workup- Calvarial hematoma, right temporoparietal and left posterior parietal regions, had developed, suggesting subacute swelling or hematoma. MRI brain negative for acute findings .CT brain showed no acute findings  -monitor for fall risk  ptot  2. Weakness generalized  ptot  3. Hypokalemia  -cont potassium chl 20meg po daily  -monitor bmp    4. Acute encephalopathy/ Dementia  - follow up with neurology    5. Hx of chest pain  -nitroglycerin 0.4mg po q 5 min for chest pain prn    6. Eye pressure  -cont levobunolol ophthalmic soln one drop both eyes daily  -cont latanoprost ophth one drop both eyes q hs    7. HL  -cont atorvastatin 20mg po q hs    8.Gout    -cont allopurinol 100mg po daily    9. Hypothyroidism  -cont levothyroxine 25mcg po daily    10. Fungal skin on legs  -cont to apply miconazole 2% powder to affected area  -wound care

## 2024-09-30 ENCOUNTER — SNF VISIT (OUTPATIENT)
Dept: INTERNAL MEDICINE CLINIC | Facility: SKILLED NURSING FACILITY | Age: 89
End: 2024-09-30

## 2024-09-30 VITALS
DIASTOLIC BLOOD PRESSURE: 79 MMHG | SYSTOLIC BLOOD PRESSURE: 139 MMHG | HEART RATE: 66 BPM | TEMPERATURE: 98 F | RESPIRATION RATE: 18 BRPM | OXYGEN SATURATION: 97 %

## 2024-09-30 DIAGNOSIS — R53.1 GENERALIZED WEAKNESS: Primary | ICD-10-CM

## 2024-09-30 DIAGNOSIS — I48.91 ATRIAL FIBRILLATION, UNSPECIFIED TYPE (HCC): ICD-10-CM

## 2024-09-30 DIAGNOSIS — H91.90 HOH (HARD OF HEARING): ICD-10-CM

## 2024-09-30 DIAGNOSIS — Z91.81 AT MODERATE RISK FOR FALL: ICD-10-CM

## 2024-09-30 DIAGNOSIS — F03.B0 MODERATE DEMENTIA, UNSPECIFIED DEMENTIA TYPE, UNSPECIFIED WHETHER BEHAVIORAL, PSYCHOTIC, OR MOOD DISTURBANCE OR ANXIETY (HCC): ICD-10-CM

## 2024-09-30 NOTE — PROGRESS NOTES
Karen Finney  : 1935  Age 89 year old  female patient is admitted to [facility] for rehabilitation and strengthening         Chief complaint: Acute encephalopathy , progressive dementia, sent to [facility] ED 9/10/24 for episode of unresponsiveness in therapy gym       HPI   89-year-old  female with history of chronic atrial fibrillation, hypertension, noncompliant with her medications who was seen by her primary care physician 2 weeks ago and given a prescription of her medications. Last week when brother called her she didn't answer and then found her lying on the floor in her house. Patient was alert, but could not recall for how long she had been on the floor. ED workup done 24  vitals stable,. CT brain showed no acute findings. There was soft tissue subcutaneous swelling. Calvarial hematoma, right temporoparietal and left posterior parietal regions, had developed, suggesting subacute swelling or hematoma. MRI brain negative for acute findings.EKG showed atrial fibrillation, rate between 90 and 110, chronic history. Neurology was consulted for encephalopathy, and based on history and exam, it was deemed she had progression of her dementia, likely undiagnosed late onset Alzheimer's disease. During hospitalization she was seen hold and talking to stuffed puppy animal as if it was a real pet. She appeared disheveled and AxO1-2.Eliquis for Afib stopped due to falls and concern for compliancy with dementia. Outpatient neurology appointment provided. She was stabilized and sent to [facility] for rehabilitation. Pt has not been compliant with her medication in [facility], developed a syncopal episode, code blue was called and pt was sent to [place] ED on 24, Pt's mental status returned to baseline upon arrival to the hospital. Pt was found to be in Afib RVR, received 10 mg of IV Diltiazem and heart rate returned to NSR 87. Blood work up in ED wnl. Pt was subsequently discharged back to  [facility] for rehab and strengthening.      Pt seen and examined in follow up. VSS .  Pt is sitting in wheelchair in large activity room ,waiting for lunch , is calm and pleasant. [place]. Saw  cardiology last week , Appears comfortable in NAD. Oriented to self.  She further told me she felt well, denied having chest pain or sob. She appeared to be confused which is her baseline , CNA reports eating better .Denied constipation or diarrhea. She participates in PT, and has not been refusing lately. Also taking her meds.  . No other new issues/concerns.         ALLERGIES:  Allergies   Allergen Reactions    Streptomycin OTHER (SEE COMMENTS)     numbness    Amoxicillin     Bee Venom     Penicillins     Vioxx [Rofecoxib]        CODE STATUS:  Full Code    ADVANCED CARE PLANNING TEAM:  Will need family care plan , unclear of dc plan, family looking into LTC/MEMORY CARE        CURRENT MEDICATIONS - reviewed and updated     Current Outpatient Medications   Medication Sig Dispense Refill    donepezil 10 MG Oral Tab Take 0.5 tablets (5 mg total) by mouth every morning for 30 days, THEN 1 tablet (10 mg total) every morning. 105 tablet 0    Potassium Chloride ER 20 MEQ Oral Tab CR Take 20 mEq by mouth daily. 2 tablet 0    rosuvastatin 10 MG Oral Tab Take 1 tablet (10 mg total) by mouth nightly. 90 tablet 1    levothyroxine 25 MCG Oral Tab Take 1 tablet (25 mcg total) by mouth every morning before breakfast. 90 tablet 3    allopurinol 100 MG Oral Tab Take 1 tablet (100 mg total) by mouth daily. 90 tablet 3    HYDROcodone-acetaminophen 5-325 MG Oral Tab Take 1 tablet by mouth every 8 (eight) hours as needed. 15 tablet 0    losartan 100 MG Oral Tab Take 0.5 tablets (50 mg total) by mouth daily. 90 tablet 1    HYDROcodone-acetaminophen 5-325 MG Oral Tab TAKE 1/2 TO 1 TABLET BY MOUTH DAILY AS NEEDED FOR PAIN 30 tablet 0    Levobunolol HCl 0.5 % Ophthalmic Solution       Coenzyme Q10 (CO Q-10) 200 MG Oral Cap Take by mouth.       Turmeric (QC TUMERIC COMPLEX OR) Take 400 mg by mouth.      Omega-3 Fatty Acids (OMEGA-3 2100 OR) Take by mouth.      Ginger, Zingiber officinalis, (GINGER OR) Take 450 mg by mouth daily.      Miconazole Nitrate 2 % External Powder Apply topically as needed for Itching.      aspirin 81 MG Oral Tab Take 1 tablet (81 mg total) by mouth daily. 30 tablet 11    nitroGLYCERIN 0.4 MG Sublingual SL Tab Place 1 tablet (0.4 mg total) under the tongue every 5 (five) minutes as needed for Chest pain. 20 tablet 1    latanoprost (XALATAN) 0.005 % Ophthalmic Solution One droplet in each eye QHS.          VITALS:  /79   Pulse 66   Temp 98 °F (36.7 °C)   Resp 18   SpO2 97%       REVIEW OF SYSTEMS:  Pt seen and examined in follow up. VSS.  Pt is sitting in wheelchair,  seen in the large activity room, waiting for lunch. Saw  Cardiology last week. Appears comfortable in NAD.  She further told me she felt well, denied having chest pain or sob. She appeared to be confused- at baseline , and staff  reported she was eating better. Denies constipation or diarrhea. She participating  in PT, and taking her meds, been more compliant. . No other new issues/concerns.       PHYSICAL EXAM:         GENERAL HEALTH: 88 y/o confused female, lying in bed, talking about her dog ( stuffed animal)   LINES, TUBES, DRAINS:  none  SKIN: no rashes, no suspicious lesions  WOUND: wound care following    EYES: PERRLA, EOMI, sclera anicteric, conjunctiva normal; there is no nystagmus, no drainage from eyes  HENT: normocephalic; normal nose, no nasal drainage, mucous membranes pink, moist, pharynx no exudate, no visible cerumen.  NECK: supple; FROM; no JVD, no TMG, no carotid bruits  BREAST: deferred  RESPIRATORY:clear to percussion and auscultation  CARDIOVASCULAR: S1, S2 normal, RRR; no S3, no S4;   ABDOMEN:  normal active BS+, soft, nondistended; no organomegaly, no masses; no bruits; nontender, no guarding, no rebound tenderness.  :no suprapubic  distension  LYMPHATIC:no lymphedema  MUSCULOSKELETAL: generalized weakness   EXTREMITIES/VASCULAR:no cyanosis, clubbing or edema  NEUROLOGIC: does not follow commands, confused   PSYCHIATRIC: oriented to self, progressive dementia      SEE PLAN BELOW     Episode of unresponsiveness in the gym  9/10/24 and 911 called   -and almost the same episode 9/16/24 in her room , 911 called and workup done in ED and pt returned to rehab   - patient was unresponsive but was breathing and had a pulse  - placed on a re -breather mask   -vs stable   -patient went to [facility] ED for eval   -Diagnosed with transient alteration of consciousness   -patient sent back to Rehab   -patient seeing Cards in [facility]  last week, unclear rf any new orders    - monitor      Fall  - lives alone , found on floor at her home   - ED workup- Calvarial hematoma, right temporoparietal and left posterior parietal regions, had developed, suggesting subacute swelling or hematoma. MRI brain negative for acute findings .CT brain showed no acute findings   - PT/OT/SPEECH   -at risk for falls, being monitored   - Monitor      Weakness generalized  - unclear if participated in therapy eval  -patient needs SW and assistance with placement to LTC/memory care  - cont vitamins - bala oil 450mg po q day , cOq 10 200 mg po daily  , Tumeric 400mg po daily   - poor nutrition - dietician following  pt and provide nutritional  supplements (is a one on one feed)   - monitor      Hypokalemia  - cont potassium chl 20meg po daily   - weekly cbc and bmp in rehab      Acute encephalopathy/  Dementia- but overall improved with compliance and behavior  - confused  - appears to have progressive dementia and advanced - very confused  - needs one on one feed, discussed with RN   - on aricept 5mg po daily   - monitor      Hx of chest pain  - nitroglycerin 0.4mg po q 5 min for chest pain prn      Eye pressure   - cont levobunolol ophthalmic soln one drop both eyes daily   - cont  latanoprost ophth one drop both eyes q hs      HL  - cont atorvastatin 20mg po q hs      Gout  - present no symptoms  - cont allopurinol 100mg po daily      Hypothyroidism   - cont levothyroxine 25mcg po daily      Fungal skin on legs  - cont to apply miconazole 2% powder to affected area  - wound rn to follow in rehab     Dvt proph /afib  - asa 81mg po daily   - had to stop eliquis due to falls per hospital records      Pain  - norco 5/325mg po q 8 prn   - tylenol 650mg po q 6 prn     This is a 25 minute visit and greater than 50% of the time was spent counseling the patient and/or coordinating care.    Kenzie Salinas, APRN  09/30/24   12:04 PM

## 2024-10-04 ENCOUNTER — SNF VISIT (OUTPATIENT)
Dept: INTERNAL MEDICINE CLINIC | Facility: SKILLED NURSING FACILITY | Age: 89
End: 2024-10-04

## 2024-10-04 DIAGNOSIS — I48.91 ATRIAL FIBRILLATION, UNSPECIFIED TYPE (HCC): Primary | ICD-10-CM

## 2024-10-04 DIAGNOSIS — G93.40 ACUTE ENCEPHALOPATHY: ICD-10-CM

## 2024-10-04 DIAGNOSIS — E87.6 HYPOKALEMIA: ICD-10-CM

## 2024-10-04 DIAGNOSIS — E78.00 HYPERCHOLESTEROLEMIA: ICD-10-CM

## 2024-10-04 DIAGNOSIS — R53.1 WEAKNESS GENERALIZED: ICD-10-CM
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
## 2024-10-04 NOTE — PROGRESS NOTES
Karen Finney  : 1935  Age 89 year old  female patient is admitted to [facility] for rehabilitation and strengthening     Chief complaint: Acute encephalopathy , progressive dementia, sent to [facility] ED 9/10/24 for episode of unresponsiveness in therapy gym       HPI   89-year-old  female with history of chronic atrial fibrillation, hypertension, noncompliant with her medications who was seen by her primary care physician 2 weeks ago and given a prescription of her medications. Last week when brother called her she didn't answer and then found her lying on the floor in her house. Patient was alert, but could not recall for how long she had been on the floor. ED workup done 24  vitals stable,. CT brain showed no acute findings. There was soft tissue subcutaneous swelling. Calvarial hematoma, right temporoparietal and left posterior parietal regions, had developed, suggesting subacute swelling or hematoma. MRI brain negative for acute findings.EKG showed atrial fibrillation, rate between 90 and 110, chronic history. Neurology was consulted for encephalopathy, and based on history and exam, it was deemed she had progression of her dementia, likely undiagnosed late onset Alzheimer's disease. During hospitalization she was seen hold and talking to stuffed puppy animal as if it was a real pet. She appeared disheveled and AxO1-2.Eliquis for Afib stopped due to falls and concern for compliancy with dementia. Outpatient neurology appointment provided. She was stabilized and sent to [facility] for rehabilitation. Pt has not been compliant with her medication in [facility], developed a syncopal episode, code blue was called and pt was sent to [place] ED on 24, Pt's mental status returned to baseline upon arrival to the hospital. Pt was found to be in Afib RVR, received 10 mg of IV Diltiazem and heart rate returned to NSR 87. Blood work up in ED wnl. Pt was subsequently discharged back to  [facility] for rehab and strengthening.      Patient seen in follow up, resting in bed. Eyes closed but she will open and nod when I talk to her. She is hard of hearing and confused at baseline. Nurse reports she refused her night meds. No shortness of breath or chest pain. No nausea. No distress noted. VSS.      ALLERGIES:  Allergies   Allergen Reactions    Streptomycin OTHER (SEE COMMENTS)     numbness    Amoxicillin     Bee Venom     Penicillins     Vioxx [Rofecoxib]        IMMUNIZATIONS  Immunization History   Administered Date(s) Administered    Covid-19 Vaccine Pfizer 30 mcg/0.3 ml 03/05/2021, 03/26/2021, 10/27/2021    FLU VAC High Dose 65 YRS & Older PRSV Free (72767) 11/24/2015, 11/07/2017, 12/12/2018, 12/17/2019, 09/02/2020, 09/27/2021    FLU VAC QIV SPLIT 3 YRS AND OLDER (67764) 12/02/2014    Fluzone Vaccine Medicare () 11/24/2015, 02/13/2017, 11/07/2017, 12/12/2018, 12/17/2019, 09/02/2020    High Dose Fluzone Influenza Vaccine, 65yr+ PF 0.5mL (67136) 02/13/2017    Pneumococcal (Prevnar 13) 11/24/2015    Pneumovax 23 11/07/2017    Zoster Vaccine Live (Zostavax) 06/07/2013        CODE STATUS:  Full Code    ADVANCED CARE PLANNING TEAM: Will need family care plan    CURRENT MEDICATIONS - reviewed and updated on SNF EMR     SUBJECTIVE       Patient seen in follow up, resting in bed. Eyes closed but she will open and nod when I talk to her. She is hard of hearing and confused at baseline. Nurse reports she refused her night meds. No shortness of breath or chest pain. No nausea. No distress noted. VSS.    PHYSICAL EXAM:  VITALS /65 HR 64 RR 18 SPO2 97% on room air  GENERAL HEALTH: 89 y/o confused female, lying in bed, holding her dog ( stuffed animal)   LINES, TUBES, DRAINS:  none  SKIN: no rashes, no suspicious lesions  WOUND: wound care following    EYES: PERRLA, EOMI, sclera anicteric, conjunctiva normal; there is no nystagmus, no drainage from eyes  HENT: normocephalic; normal nose, no nasal  drainage, mucous membranes pink, moist, pharynx no exudate, no visible cerumen.  NECK: supple; FROM; no JVD, no TMG, no carotid bruits  BREAST: deferred  RESPIRATORY:clear to percussion and auscultation  CARDIOVASCULAR: S1, S2 normal, RRR; no S3, no S4;   ABDOMEN:  normal active BS+, soft, nondistended; no organomegaly, no masses; no bruits; nontender, no guarding, no rebound tenderness.  :no suprapubic distension  LYMPHATIC:no lymphedema  MUSCULOSKELETAL: generalized weakness   EXTREMITIES/VASCULAR:no cyanosis, clubbing or edema  NEUROLOGIC: does not follow commands, confused   PSYCHIATRIC: oriented to self, progressive dementia      SEE PLAN BELOW  Episode of unresponsiveness in the gym  9/10/24 and 911 called   -and almost the same episode 9/16/24 in her room , 911 called and workup done in ED and pt returned to rehab   - patient was unresponsive but was breathing and had a pulse  - placed on a re -breather mask   -vs stable   -patient went to [facility] ED for eval   -Diagnosed with transient alteration of consciousness   -patient sent back to Rehab   -patient seeing Cards in [facility]  last week, unclear rf any new orders    - monitor      Fall  - lives alone , found on floor at her home   - ED workup- Calvarial hematoma, right temporoparietal and left posterior parietal regions, had developed, suggesting subacute swelling or hematoma. MRI brain negative for acute findings .CT brain showed no acute findings   - PT/OT/SPEECH   -at risk for falls, being monitored   - Monitor      Weakness generalized  - unclear if participated in therapy eval  -patient needs SW and assistance with placement to LTC/memory care  - cont vitamins - bala oil 450mg po q day , cOq 10 200 mg po daily  , Tumeric 400mg po daily   - poor nutrition - dietician following  pt and provide nutritional  supplements (is a one on one feed)   - monitor      Hypokalemia  - cont potassium chl 20meg po daily   - weekly cbc and bmp in rehab      Acute  encephalopathy/  Dementia- but overall improved with compliance and behavior  - confused  - appears to have progressive dementia and advanced - very confused  - needs one on one feed, discussed with RN   - on aricept 5mg po daily   - monitor      Hx of chest pain  - nitroglycerin 0.4mg po q 5 min for chest pain prn      Eye pressure   - cont levobunolol ophthalmic soln one drop both eyes daily   - cont latanoprost ophth one drop both eyes q hs      HL  - cont atorvastatin 20mg po q hs      Gout  - present no symptoms  - cont allopurinol 100mg po daily      Hypothyroidism   - cont levothyroxine 25mcg po daily      Fungal skin on legs  - cont to apply miconazole 2% powder to affected area  - wound rn to follow in rehab     Dvt proph /afib  - asa 81mg po daily   - had to stop eliquis due to falls per hospital records      Pain  - norco 5/325mg po q 8 prn   - tylenol 650mg po q 6 prn       FOLLOW UP APPOINTMENTS  No future appointments.     This is a 25 minute visit and greater than 50% of the time was spent counseling the patient and/or coordinating care.    Note to patient: The 21st Century Cures Act makes medical notes like these available to patients in the interest of transparency. However, this is a medical document intended as peer to peer communication. It is written in medical language and may contain abbreviations or verbiage that are unfamiliar. It may appear blunt or direct. Medical documents are intended to carry relevant information, facts as evident, and the clinical opinion of the practitioner who signs the document.     Jazmyne Singh, APRN  10/04/24

## 2024-10-07 ENCOUNTER — SNF VISIT (OUTPATIENT)
Dept: INTERNAL MEDICINE CLINIC | Facility: SKILLED NURSING FACILITY | Age: 89
End: 2024-10-07

## 2024-10-07 VITALS
OXYGEN SATURATION: 96 % | WEIGHT: 112 LBS | SYSTOLIC BLOOD PRESSURE: 128 MMHG | RESPIRATION RATE: 18 BRPM | TEMPERATURE: 98 F | DIASTOLIC BLOOD PRESSURE: 77 MMHG | HEART RATE: 100 BPM | BODY MASS INDEX: 19 KG/M2

## 2024-10-07 DIAGNOSIS — Q24.9: Primary | ICD-10-CM

## 2024-10-07 DIAGNOSIS — R53.1 GENERALIZED WEAKNESS: ICD-10-CM

## 2024-10-07 DIAGNOSIS — Z91.81 AT MODERATE RISK FOR FALL: ICD-10-CM

## 2024-10-07 DIAGNOSIS — R52 PAIN: ICD-10-CM

## 2024-10-07 DIAGNOSIS — F03.B0 MODERATE DEMENTIA, UNSPECIFIED DEMENTIA TYPE, UNSPECIFIED WHETHER BEHAVIORAL, PSYCHOTIC, OR MOOD DISTURBANCE OR ANXIETY (HCC): ICD-10-CM

## 2024-10-07 NOTE — PROGRESS NOTES
Karen Finney  : 1935  Age 89 year old  female patient is admitted to [facility] for rehabilitation and strengthening      Chief complaint: Acute encephalopathy , progressive dementia, sent to [facility] ED 9/10/24 for episode of unresponsiveness in therapy gym       HPI   89-year-old  female with history of chronic atrial fibrillation, hypertension, noncompliant with her medications who was seen by her primary care physician 2 weeks ago and given a prescription of her medications. Last week when brother called her she didn't answer and then found her lying on the floor in her house. Patient was alert, but could not recall for how long she had been on the floor. ED workup done 24  vitals stable,. CT brain showed no acute findings. There was soft tissue subcutaneous swelling. Calvarial hematoma, right temporoparietal and left posterior parietal regions, had developed, suggesting subacute swelling or hematoma. MRI brain negative for acute findings.EKG showed atrial fibrillation, rate between 90 and 110, chronic history. Neurology was consulted for encephalopathy, and based on history and exam, it was deemed she had progression of her dementia, likely undiagnosed late onset Alzheimer's disease. During hospitalization she was seen hold and talking to stuffed puppy animal as if it was a real pet. She appeared disheveled and AxO1-2.Eliquis for Afib stopped due to falls and concern for compliancy with dementia. Outpatient neurology appointment provided. She was stabilized and sent to [facility] for rehabilitation. Pt has not been compliant with her medication in [facility], developed a syncopal episode, code blue was called and pt was sent to [place] ED on 24, Pt's mental status returned to baseline upon arrival to the hospital. Pt was found to be in Afib RVR, received 10 mg of IV Diltiazem and heart rate returned to NSR 87. Blood work up in ED wnl. Pt was subsequently discharged back to  [facility] for [facility] and strengthening.      Patient seen in follow up,sitting on side  bed. [place] but did participate in conversation, confused at baseline but so alert and calm. VSS .   Nurse reports taking her  meds and participating in activities. SW working on surrogate forms. . No shortness of breath or chest pain. No nausea. No distress noted.  No changes in bowel or bladder . No other new issues or concerns. CRISTA working on dc plan.        ALLERGIES:  Allergies   Allergen Reactions    Streptomycin OTHER (SEE COMMENTS)     numbness    Amoxicillin     Bee Venom     Penicillins     Vioxx [Rofecoxib]        CODE STATUS:  Full Code    ADVANCED CARE PLANNING TEAM: Will need family care plan, brother very involved     CURRENT MEDICATIONS - reviewed and updated     Current Outpatient Medications   Medication Sig Dispense Refill    donepezil 10 MG Oral Tab Take 0.5 tablets (5 mg total) by mouth every morning for 30 days, THEN 1 tablet (10 mg total) every morning. 105 tablet 0    Potassium Chloride ER 20 MEQ Oral Tab CR Take 20 mEq by mouth daily. 2 tablet 0    rosuvastatin 10 MG Oral Tab Take 1 tablet (10 mg total) by mouth nightly. 90 tablet 1    levothyroxine 25 MCG Oral Tab Take 1 tablet (25 mcg total) by mouth every morning before breakfast. 90 tablet 3    allopurinol 100 MG Oral Tab Take 1 tablet (100 mg total) by mouth daily. 90 tablet 3    HYDROcodone-acetaminophen 5-325 MG Oral Tab Take 1 tablet by mouth every 8 (eight) hours as needed. 15 tablet 0    losartan 100 MG Oral Tab Take 0.5 tablets (50 mg total) by mouth daily. 90 tablet 1    HYDROcodone-acetaminophen 5-325 MG Oral Tab TAKE 1/2 TO 1 TABLET BY MOUTH DAILY AS NEEDED FOR PAIN 30 tablet 0    Levobunolol HCl 0.5 % Ophthalmic Solution       Coenzyme Q10 (CO Q-10) 200 MG Oral Cap Take by mouth.      Turmeric (QC TUMERIC COMPLEX OR) Take 400 mg by mouth.      Omega-3 Fatty Acids (OMEGA-3 2100 OR) Take by mouth.      Ginger, Zingiber officinalis, (GINGER  OR) Take 450 mg by mouth daily.      Miconazole Nitrate 2 % External Powder Apply topically as needed for Itching.      aspirin 81 MG Oral Tab Take 1 tablet (81 mg total) by mouth daily. 30 tablet 11    nitroGLYCERIN 0.4 MG Sublingual SL Tab Place 1 tablet (0.4 mg total) under the tongue every 5 (five) minutes as needed for Chest pain. 20 tablet 1    latanoprost (XALATAN) 0.005 % Ophthalmic Solution One droplet in each eye QHS.          VITALS:  /77   Pulse 100   Temp 98 °F (36.7 °C)   Resp 18   Wt 112 lb (50.8 kg)   SpO2 96%   BMI 18.64 kg/m²       REVIEW OF SYSTEMS:  Patient seen in follow up, sitting at side of  bed and eating lunch. Alert and oriented x 1 to2 , at baseline and calm, very [place]. Cooperative on exam .  She is hard of hearing and confused at baseline. Nurse reports she is taking her  meds. No shortness of breath or chest pain. No nausea. No distress noted.  No other new issues or concerns. SW working on dc plan and surrogate forms.       PHYSICAL EXAM:  GENERAL HEALTH: 89 y/o confused female, lying in bed, holding her dog ( stuffed animal)   LINES, TUBES, DRAINS:  none  SKIN: no rashes, no suspicious lesions  WOUND: wound care following    EYES: PERRLA, EOMI, sclera anicteric, conjunctiva normal; there is no nystagmus, no drainage from eyes  HENT: normocephalic; normal nose, no nasal drainage, mucous membranes pink, moist, pharynx no exudate, no visible cerumen.  NECK: supple; FROM; no JVD, no TMG, no carotid bruits  BREAST: deferred  RESPIRATORY:clear to percussion and auscultation  CARDIOVASCULAR: S1, S2 normal, RRR; no S3, no S4;   ABDOMEN:  normal active BS+, soft, nondistended; no organomegaly, no masses; no bruits; nontender, no guarding, no rebound tenderness.  :no suprapubic distension  LYMPHATIC:no lymphedema  MUSCULOSKELETAL: generalized weakness   EXTREMITIES/VASCULAR:no cyanosis, clubbing or edema  NEUROLOGIC: does not follow commands, confused   PSYCHIATRIC: oriented to  self, progressive dementia      SEE PLAN BELOW  Episode of unresponsiveness in the gym  9/10/24 and 911 called   -and almost the same episode 9/16/24 in her room , 911 called and workup done in ED and pt returned to rehab   - patient was unresponsive but was breathing and had a pulse  - placed on a re -breather mask   -vs stable   -patient went to [facility] ED for eval   -Diagnosed with transient alteration of consciousness   -patient sent back to Rehab   -patient followed by  Cards in [facility]   -10/7 no recent events of unresponsiveness   - monitor      Fall  - lives alone , found on floor at her home   - ED workup- Calvarial hematoma, right temporoparietal and left posterior parietal regions, had developed, suggesting subacute swelling or hematoma. MRI brain negative for acute findings .CT brain showed no acute findings   - PT/OT/SPEECH   -at risk for falls, being monitored   -no recent falls in rehab   - Monitor      Weakness generalized- improving   - unclear if participated in therapy eval  -patient needs SW and assistance with placement to LTC/memory care  - cont vitamins - bala oil 450mg po q day , cOq 10 200 mg po daily  , Tumeric 400mg po daily   - poor nutrition - dietician following  pt and provide nutritional  supplements (is a one on one feed)   - monitor      Hypokalemia  - cont potassium chl 20meg po daily   - weekly cbc and bmp in rehab      Acute encephalopathy/  Dementia- but overall improved with compliance and behavior  - confused  - appears to have progressive dementia and advanced - very confused  - needs one on one feed, discussed with RN   - on aricept 5mg po daily   - monitor      Hx of chest pain  - nitroglycerin 0.4mg po q 5 min for chest pain prn      Eye pressure   - cont levobunolol ophthalmic soln one drop both eyes daily   - cont latanoprost ophth one drop both eyes q hs      HL  - cont atorvastatin 20mg po q hs      Gout  - present no symptoms  - cont allopurinol 100mg po daily       Hypothyroidism   - cont levothyroxine 25mcg po daily      Fungal skin on legs  - cont to apply miconazole 2% powder to affected area  - wound rn to follow in rehab     Dvt proph /afib  - asa 81mg po daily   - had to stop eliquis due to falls per hospital records      Pain  - norco 5/325mg po q 8 prn   - tylenol 650mg po q 6 prn        This is a 25 minute visit and greater than 50% of the time was spent counseling the patient and/or coordinating care.    Kenzie Salinas, APRN  10/07/24   11:56 AM

## 2024-10-12 ENCOUNTER — EXTERNAL FACILITY (OUTPATIENT)
Dept: INTERNAL MEDICINE CLINIC | Facility: CLINIC | Age: 89
End: 2024-10-12

## 2024-10-12 DIAGNOSIS — E03.8 OTHER SPECIFIED HYPOTHYROIDISM: ICD-10-CM

## 2024-10-12 DIAGNOSIS — I48.0 PAROXYSMAL ATRIAL FIBRILLATION (HCC): ICD-10-CM

## 2024-10-12 DIAGNOSIS — S31.000D WOUND OF SACRAL REGION, SUBSEQUENT ENCOUNTER: Primary | ICD-10-CM

## 2024-10-12 NOTE — PROGRESS NOTES
follow up  seen 10/11/2024  Past Medical History:   ASHD (arteriosclerotic heart disease)   Concussion   from fall   Essential hypertension   Gastric ulcer without hemorrhage or perforation, unspecified chronicity   Glaucoma   Gout   HTN (hypertension)   Hyperlipidemia   OA (osteoarthritis)   Osteopenia   last dexa 2015 Frax score 14% and 3.5% Frax 2017 155% and 4.7%    Past Surgical History:  Procedure Laterality Date   Breast biopsy   Cataract extraction Bilateral   Hysterectomy   Knee arthroscopy Right   Other surgical history Left 2013   L femur fracture with redo   Pt w/ coronary artery stent 2012   bare metal    Social History    Socioeconomic History   Marital status: Single  Tobacco Use   Smoking status: Never   Smokeless tobacco: Never  Vaping Use   Vaping status: Never Used  Substance and Sexual Activity   Alcohol use: Not Currently   Alcohol/week: 2.0 standard drinks of alcohol   Types: 2 Glasses of wine per week   Drug use: No    S  Allergies  Allergen Reactions   Streptomycin OTHER (SEE COMMENTS)   numbness   Amoxicillin   Bee Venom   Penicillins   Vioxx [Rofecoxib]    CURRENT MEDICATIONS - reviewed    REVIEW OF SYSTEMS:  she has no complains,at her baseline   PHYSICAL EXAM:      EYES: PERRLA, EOMI, sclera anicteric, conjunctiva normal; there is no nystagmus, no drainage from eyes  HENT: normocephalic; normal nose, no nasal drainage, mucous membranes pink, moist, pharynx no exudate, no visible cerumen.  NECK: supple; FROM; no JVD, no TMG, no carotid bruits  BREAST: deferred  RESPIRATORY:clear to percussion and auscultation  CARDIOVASCULAR: S1, S2 normal, RRR; no S3, no S4;  ABDOMEN: normal active BS+, soft, nondistended; no organomegaly, no masses; no bruits; nontender, no guarding, no rebound tenderness.  :no suprapubic distension  LYMPHATIC:no lymphedema  MUSCULOSKELETAL: generalized weakness  EXTREMITIES/VASCULAR:no cyanosis, clubbing or edema  NEUROLOGIC: does not follow commands,  confused  PSYCHIATRIC: oriented to self, progressive dementia    a/p    PLAN: sacral wound- wound care is following, continue with wound care, afib stable , continue with current meds,  working on surrogate    1. Fall    -ED workup- Calvarial hematoma, right temporoparietal and left posterior parietal regions, had developed, suggesting subacute swelling or hematoma. MRI brain negative for acute findings .CT brain showed no acute findings  -monitor for fall risk  ptot  2. Weakness generalized  ptot  3. Hypokalemia  -cont potassium chl 20meg po daily  -monitor bmp    4. Acute encephalopathy/ Dementia  - follow up with neurology    5. Hx of chest pain  -nitroglycerin 0.4mg po q 5 min for chest pain prn    6. Eye pressure  -cont levobunolol ophthalmic soln one drop both eyes daily  -cont latanoprost ophth one drop both eyes q hs    7. HL  -cont atorvastatin 20mg po q hs    8.Gout    -cont allopurinol 100mg po daily    9. Hypothyroidism  -cont levothyroxine 25mcg po daily    10. Fungal skin on legs  -cont to apply miconazole 2% powder to affected area  -wound care

## 2024-10-16 ENCOUNTER — SNF DISCHARGE (OUTPATIENT)
Dept: INTERNAL MEDICINE CLINIC | Facility: SKILLED NURSING FACILITY | Age: 89
End: 2024-10-16

## 2024-10-16 VITALS
HEART RATE: 95 BPM | SYSTOLIC BLOOD PRESSURE: 106 MMHG | OXYGEN SATURATION: 97 % | WEIGHT: 112 LBS | BODY MASS INDEX: 19 KG/M2 | DIASTOLIC BLOOD PRESSURE: 63 MMHG | RESPIRATION RATE: 18 BRPM | TEMPERATURE: 98 F

## 2024-10-16 DIAGNOSIS — R40.4 RECURRENT EPISODES OF UNRESPONSIVENESS: ICD-10-CM

## 2024-10-16 DIAGNOSIS — R41.82: Primary | ICD-10-CM

## 2024-10-16 DIAGNOSIS — F03.C0 SEVERE DEMENTIA, UNSPECIFIED DEMENTIA TYPE, UNSPECIFIED WHETHER BEHAVIORAL, PSYCHOTIC, OR MOOD DISTURBANCE OR ANXIETY (HCC): ICD-10-CM

## 2024-10-16 DIAGNOSIS — Z91.81 AT MODERATE RISK FOR FALL: ICD-10-CM

## 2024-10-16 DIAGNOSIS — R53.1 GENERALIZED WEAKNESS: ICD-10-CM

## 2024-10-16 PROCEDURE — 99310 SBSQ NF CARE HIGH MDM 45: CPT | Performed by: NURSE PRACTITIONER

## 2024-10-16 NOTE — PROGRESS NOTES
Karen Finney, 6/30/1935, 89 year old, female is being discharged from [facility] 10/18/24 to [facility] , BROTHER signed surrogate forms      DISCHARGE SUMMARY    Date of Discharge [facility] : 9/2/24 to 10/18/24                                 Admitting Diagnoses:Acute encephalopathy , progressive dementia, sent to [facility] ED 9/10/24 for episode of unresponsiveness in therapy gym       HPI   89-year-old  female with history of chronic atrial fibrillation, hypertension, noncompliant with her medications who was seen by her primary care physician 2 weeks ago and given a prescription of her medications. Last week when brother called her she didn't answer and then found her lying on the floor in her house. Patient was alert, but could not recall for how long she had been on the floor. ED workup done 9/2/24  vitals stable,. CT brain showed no acute findings. There was soft tissue subcutaneous swelling. Calvarial hematoma, right temporoparietal and left posterior parietal regions, had developed, suggesting subacute swelling or hematoma. MRI brain negative for acute findings.EKG showed atrial fibrillation, rate between 90 and 110, chronic history. Neurology was consulted for encephalopathy, and based on history and exam, it was deemed she had progression of her dementia, likely undiagnosed late onset Alzheimer's disease. During hospitalization she was seen hold and talking to stuffed puppy animal as if it was a real pet. She appeared disheveled and AxO1-2.Eliquis for Afib stopped due to falls and concern for compliancy with dementia. Outpatient neurology appointment provided. She was stabilized and sent to [facility] for rehabilitation. Pt has not been compliant with her medication in [facility], developed a syncopal episode, code blue was called and pt was sent to [place] ED on 09/16/24, Pt's mental status returned to baseline upon arrival to the hospital. Pt was found to be in  Afib RVR, received 10 mg of IV Diltiazem and heart rate returned to NSR 87. Blood work up in ED wnl. Pt was subsequently discharged back to [facility] for rehab and strengthening.      Patient seen in follow up,sitting in big activity room . [place] but did participate in conversation, confused at baseline but  alert and calm. VSS .   Nurse reports taking her  meds and participating in activities but does wax and wean . CRISTA completed  surrogate forms, brother involved.  Patient to be discharged to LTC at [facility] 10/18/24 .   No shortness of breath or chest pain. No nausea. No distress noted.  No changes in bowel or bladder . No other new issues or concerns.  Reviewed dc plan, should be ready to be transferred to LTC.        REVIEW OF SYSTEMS:  Patient seen in follow up, sitting in large activity room. . Alert and oriented x 1 to2 , at baseline and calm, very [place]. Cooperative on exam .  She is hard of hearing and confused at baseline. Nurse reports she is taking her  meds. No shortness of breath or chest pain. No nausea. No distress noted.  No other new issues or concerns. CRISTA completed  surrogate forms, brother involved, pt being transferred to [facility] LT plan was  for 10/18/24. .         PHYSICAL EXAM:  GENERAL HEALTH: 89 y/o confused female,in large activity room , holding her dog ( stuffed animal)   LINES, TUBES, DRAINS:  none  SKIN: no rashes, no suspicious lesions  WOUND: wound care following    EYES: PERRLA, EOMI, sclera anicteric, conjunctiva normal; there is no nystagmus, no drainage from eyes  HENT: normocephalic; normal nose, no nasal drainage, mucous membranes pink, moist, pharynx no exudate, no visible cerumen.  NECK: supple; FROM; no JVD, no TMG, no carotid bruits  BREAST: deferred  RESPIRATORY:clear to percussion and auscultation  CARDIOVASCULAR: S1, S2 normal, RRR; no S3, no S4;   ABDOMEN:  normal active BS+, soft, nondistended; no organomegaly, no masses; no bruits; nontender, no guarding, no  rebound tenderness.  :no suprapubic distension  LYMPHATIC:no lymphedema  MUSCULOSKELETAL: generalized weakness   EXTREMITIES/VASCULAR:no cyanosis, clubbing or edema  NEUROLOGIC: does not follow commands, confused   PSYCHIATRIC: oriented to self, progressive dementia      SEE PLAN BELOW  Episode of unresponsiveness in the gym  9/10/24 and 911 called   -and almost the same episode 9/16/24 in her room , 911 called and workup done in ED and pt returned to rehab   - patient was unresponsive but was breathing and had a pulse  - placed on a re -breather mask   -vs stable   -patient went to [facility] ED for eval   -Diagnosed with transient alteration of consciousness   -patient sent back to Rehab   -patient followed by  Cards in [facility]   -10/17 no recent events of unresponsiveness   - monitor      Fall  - lives alone , found on floor at her home   - ED workup- Calvarial hematoma, right temporoparietal and left posterior parietal regions, had developed, suggesting subacute swelling or hematoma. MRI brain negative for acute findings .CT brain showed no acute findings   - PT/OT/SPEECH   -at risk for falls, being monitored   -no recent falls in rehab   - Monitor      Weakness generalized- improving   - unclear if participated in therapy eval  -patient needs SW and assistance with placement to LTC/memory care  - cont vitamins - bala oil 450mg po q day , cOq 10 200 mg po daily  , Tumeric 400mg po daily   - poor nutrition - dietician following  pt and provide nutritional  supplements (is a one on one feed)   - monitor      Hypokalemia  - cont potassium chl 20meg po daily   - weekly cbc and bmp in rehab , stable      Acute encephalopathy/  Dementia- but overall improved with compliance and behavior  - confused  - appears to have progressive dementia and advanced - very confused  - needs one on one feed, discussed with RN   - on aricept 5mg po daily   - monitor      Hx of chest pain  - nitroglycerin 0.4mg po q 5 min for chest pain prn       Eye pressure   - cont levobunolol ophthalmic soln one drop both eyes daily   - cont latanoprost ophth one drop both eyes q hs      HL  - cont atorvastatin 20mg po q hs      Gout  - present no symptoms  - cont allopurinol 100mg po daily      Hypothyroidism   - cont levothyroxine 25mcg po daily      Fungal skin on legs  - cont to apply miconazole 2% powder to affected area  - wound rn to follow in rehab     Dvt proph /afib  - asa 81mg po daily   - had to stop eliquis due to falls per hospital records      Pain  - norco 5/325mg po q 8 prn   - tylenol 650mg po q 6 prn     Discharge plan   -SW INVOLVED   -Brother involved  -surrogate forms completed   -patient to be discharged to [facility] 10/18/24   Reviewed discharge plan- should be all set     This is a 35 minute visit and greater than 50% of the time was spent counseling the patient and/or coordinating care.    Kenzie Salinas, APRN  10/16/2024  1:18 PM

## 2025-03-04 ENCOUNTER — TELEPHONE (OUTPATIENT)
Dept: INTERNAL MEDICINE CLINIC | Facility: CLINIC | Age: 89
End: 2025-03-04

## (undated) DEVICE — FORCEP RADIAL JAW 4

## (undated) DEVICE — ENDOSCOPY PACK UPPER: Brand: MEDLINE INDUSTRIES, INC.

## (undated) DEVICE — Device: Brand: DEFENDO AIR/WATER/SUCTION AND BIOPSY VALVE

## (undated) NOTE — ED AVS SNAPSHOT
[facility]    [address]     [place] [address]    Phone:  [phone]    Fax:  [phone]           Shari Bolton   MRN: [id number]    Department:  [facility]   Date of Visit:  4/1 and Class Registration line at [phone] or find a doctor online by visiting [email or web address].    IF THERE IS ANY CHANGE OR WORSENING OF YOUR CONDITION, CALL YOUR PRIMARY CARE PHYSICIAN AT ONCE OR RETURN IMMEDIATELY TO [address] [place] [address].     If

## (undated) NOTE — ED AVS SNAPSHOT
[facility] Emergency Department    [address]     [place] [address]    Phone:  [phone]    Fax:  [phone]           Tim Marti   MRN: [id number]    Department:  [facility] Emergency Department   Date of Visit:  4/1 If you have difficulty scheduling your follow-up appointment as directed, please call our  at [phone]. Si tiene problemas para programar yohana bossman de seguimiento según lo indicado, llame al encargado de ronn al [phone].     It i continue to take your medications as instructed by your Primary Care doctor until you can check with your doctor. Please bring the medication list to your next doctor's appointment.     Any imaging studies and labs completed today can be reviewed in your [place] plans. To get signed up and covered, please call [phone] and ask to get set up for an insurance coverage that is in-network with [employer]. Pearl     Sign up for [employer], your secure online medical record.   [employer] [facility]

## (undated) NOTE — IP AVS SNAPSHOT
[facility]    [address], [place], [place] ~ [phone]                Discharge Summary   6/13/2017    [facility]           Admission Information        Provider Department    6/13/2017 Meagan Diallo MD [facility] These medications were sent to [address] [place] [address] Court, [address] [place] [address]. [phone], [address] [place]     Phone:  [phone]    - Pantoprazole Sodium 40 MG Tbec              Patient Instructions       Avoid all Why:  As needed    Contact information:    [facility] [address]  [phone]          Follow up with Nadine Shrestha MD.    Specialty:  GASTROENTEROLOGY    Why:  As needed    Contact information:    [address] - If you are a smoker or have smoked in the last 12 months, we encourage you to explore options for quitting.     - If you have concerns related to behavioral health issues or thoughts of harming yourself, contact [address] [facility] a